# Patient Record
Sex: FEMALE | Race: WHITE | NOT HISPANIC OR LATINO | Employment: OTHER | URBAN - METROPOLITAN AREA
[De-identification: names, ages, dates, MRNs, and addresses within clinical notes are randomized per-mention and may not be internally consistent; named-entity substitution may affect disease eponyms.]

---

## 2017-01-26 ENCOUNTER — LAB CONVERSION - ENCOUNTER (OUTPATIENT)
Dept: OTHER | Facility: OTHER | Age: 82
End: 2017-01-26

## 2017-01-26 LAB — FERRITIN SERPL-MCNC: 37 NG/ML (ref 20–288)

## 2017-01-27 ENCOUNTER — GENERIC CONVERSION - ENCOUNTER (OUTPATIENT)
Dept: OTHER | Facility: OTHER | Age: 82
End: 2017-01-27

## 2017-05-05 ENCOUNTER — LAB CONVERSION - ENCOUNTER (OUTPATIENT)
Dept: OTHER | Facility: OTHER | Age: 82
End: 2017-05-05

## 2017-05-05 LAB
CHOLEST SERPL-MCNC: 158 MG/DL (ref 125–200)
CHOLEST/HDLC SERPL: 3.4 (CALC)
HBA1C MFR BLD HPLC: 6 % OF TOTAL HGB
HDLC SERPL-MCNC: 47 MG/DL
LDL CHOLESTEROL (HISTORICAL): 92 MG/DL (CALC)
NON-HDL-CHOL (CHOL-HDL) (HISTORICAL): 111 MG/DL (CALC)
TRIGL SERPL-MCNC: 97 MG/DL

## 2017-05-08 ENCOUNTER — GENERIC CONVERSION - ENCOUNTER (OUTPATIENT)
Dept: OTHER | Facility: OTHER | Age: 82
End: 2017-05-08

## 2017-05-10 ENCOUNTER — ALLSCRIPTS OFFICE VISIT (OUTPATIENT)
Dept: OTHER | Facility: OTHER | Age: 82
End: 2017-05-10

## 2017-10-02 DIAGNOSIS — E78.5 HYPERLIPIDEMIA: ICD-10-CM

## 2017-10-02 DIAGNOSIS — E11.40 TYPE 2 DIABETES MELLITUS WITH DIABETIC NEUROPATHY (HCC): ICD-10-CM

## 2017-10-02 DIAGNOSIS — F41.9 ANXIETY DISORDER: ICD-10-CM

## 2017-10-02 DIAGNOSIS — G47.33 OBSTRUCTIVE SLEEP APNEA: ICD-10-CM

## 2017-10-02 DIAGNOSIS — I10 ESSENTIAL (PRIMARY) HYPERTENSION: ICD-10-CM

## 2017-10-02 DIAGNOSIS — M06.9 RHEUMATOID ARTHRITIS(714.0): ICD-10-CM

## 2017-10-13 ENCOUNTER — LAB CONVERSION - ENCOUNTER (OUTPATIENT)
Dept: OTHER | Facility: OTHER | Age: 82
End: 2017-10-13

## 2017-10-13 ENCOUNTER — GENERIC CONVERSION - ENCOUNTER (OUTPATIENT)
Dept: OTHER | Facility: OTHER | Age: 82
End: 2017-10-13

## 2017-10-13 LAB
A/G RATIO (HISTORICAL): 1.1 (CALC) (ref 1–2.5)
A/G RATIO (HISTORICAL): 1.3 (CALC) (ref 1–2.5)
ALBUMIN SERPL BCP-MCNC: 3.6 G/DL (ref 3.6–5.1)
ALBUMIN SERPL BCP-MCNC: 3.7 G/DL (ref 3.6–5.1)
ALP SERPL-CCNC: 103 U/L (ref 33–130)
ALP SERPL-CCNC: 103 U/L (ref 33–130)
ALT SERPL W P-5'-P-CCNC: 5 U/L (ref 6–29)
ALT SERPL W P-5'-P-CCNC: 5 U/L (ref 6–29)
AST SERPL W P-5'-P-CCNC: 11 U/L (ref 10–35)
AST SERPL W P-5'-P-CCNC: 12 U/L (ref 10–35)
BASOPHILS # BLD AUTO: 0.5 %
BASOPHILS # BLD AUTO: 37 CELLS/UL (ref 0–200)
BILIRUB SERPL-MCNC: 0.4 MG/DL (ref 0.2–1.2)
BILIRUB SERPL-MCNC: 0.5 MG/DL (ref 0.2–1.2)
BILIRUBIN DIRECT (HISTORICAL): 0.1 MG/DL
BUN SERPL-MCNC: 35 MG/DL (ref 7–25)
BUN/CREA RATIO (HISTORICAL): 30 (CALC) (ref 6–22)
C-REACTIVE PROTEIN (HISTORICAL): 8.5 MG/L
CALCIUM SERPL-MCNC: 8.8 MG/DL (ref 8.6–10.4)
CHLORIDE SERPL-SCNC: 107 MMOL/L (ref 98–110)
CHOLEST SERPL-MCNC: 168 MG/DL
CHOLEST/HDLC SERPL: 4 (CALC)
CO2 SERPL-SCNC: 24 MMOL/L (ref 20–31)
CREAT SERPL-MCNC: 1.14 MG/DL (ref 0.6–0.88)
CREAT SERPL-MCNC: 1.18 MG/DL (ref 0.6–0.88)
DEPRECATED RDW RBC AUTO: 13.8 % (ref 11–15)
EGFR AFRICAN AMERICAN (HISTORICAL): 50 ML/MIN/1.73M2
EGFR AFRICAN AMERICAN (HISTORICAL): 52 ML/MIN/1.73M2
EGFR-AMERICAN CALC (HISTORICAL): 43 ML/MIN/1.73M2
EGFR-AMERICAN CALC (HISTORICAL): 45 ML/MIN/1.73M2
EOSINOPHIL # BLD AUTO: 161 CELLS/UL (ref 15–500)
EOSINOPHIL # BLD AUTO: 2.2 %
ERYTHROCYTE SEDIMENTATION RATE (HISTORICAL): 38 MM/H
GAMMA GLOBULIN (HISTORICAL): 2.9 G/DL (CALC) (ref 1.9–3.7)
GAMMA GLOBULIN (HISTORICAL): 3.2 G/DL (CALC) (ref 1.9–3.7)
GLUCOSE (HISTORICAL): 155 MG/DL (ref 65–99)
HBA1C MFR BLD HPLC: 5.7 % OF TOTAL HGB
HCT VFR BLD AUTO: 35.4 % (ref 35–45)
HDLC SERPL-MCNC: 42 MG/DL
HGB BLD-MCNC: 11.5 G/DL (ref 11.7–15.5)
INDIRECT BILIRUBIN (HISTORICAL): 0.3 MG/DL (CALC) (ref 0.2–1.2)
LDL CHOLESTEROL (HISTORICAL): 106 MG/DL (CALC)
LYMPHOCYTES # BLD AUTO: 10.3 %
LYMPHOCYTES # BLD AUTO: 752 CELLS/UL (ref 850–3900)
MCH RBC QN AUTO: 29.5 PG (ref 27–33)
MCHC RBC AUTO-ENTMCNC: 32.5 G/DL (ref 32–36)
MCV RBC AUTO: 90.8 FL (ref 80–100)
MONOCYTES # BLD AUTO: 701 CELLS/UL (ref 200–950)
MONOCYTES (HISTORICAL): 9.6 %
NEUTROPHILS # BLD AUTO: 5650 CELLS/UL (ref 1500–7800)
NEUTROPHILS # BLD AUTO: 77.4 %
NON-HDL-CHOL (CHOL-HDL) (HISTORICAL): 126 MG/DL (CALC)
PLATELET # BLD AUTO: 186 THOUSAND/UL (ref 140–400)
PMV BLD AUTO: 10.3 FL (ref 7.5–12.5)
POTASSIUM SERPL-SCNC: 4.9 MMOL/L (ref 3.5–5.3)
RBC # BLD AUTO: 3.9 MILLION/UL (ref 3.8–5.1)
SODIUM SERPL-SCNC: 139 MMOL/L (ref 135–146)
TOTAL PROTEIN (HISTORICAL): 6.6 G/DL (ref 6.1–8.1)
TOTAL PROTEIN (HISTORICAL): 6.8 G/DL (ref 6.1–8.1)
TRIGL SERPL-MCNC: 107 MG/DL
WBC # BLD AUTO: 7.3 THOUSAND/UL (ref 3.8–10.8)

## 2017-10-17 ENCOUNTER — GENERIC CONVERSION - ENCOUNTER (OUTPATIENT)
Dept: OTHER | Facility: OTHER | Age: 82
End: 2017-10-17

## 2018-01-10 NOTE — RESULT NOTES
Verified Results  (1) COMPREHENSIVE METABOLIC PANEL [id number] 62:03TL True Ada     Test Name Result Flag Reference   GLUCOSE 155 mg/dL H 65-99   Fasting reference interval     For someone without known diabetes, a glucose  value >125 mg/dL indicates that they may have  diabetes and this should be confirmed with a  follow-up test    UREA NITROGEN (BUN) 35 mg/dL H 7-25   CREATININE 1 18 mg/dL H 0 60-0 88   For patients >52years of age, the reference limit  for Creatinine is approximately 13% higher for people  identified as -American  eGFR NON-AFR  AMERICAN 43 mL/min/1 73m2 L > OR = 60   eGFR AFRICAN AMERICAN 50 mL/min/1 73m2 L > OR = 60   BUN/CREATININE RATIO 30 (calc) H 6-22   SODIUM 139 mmol/L  135-146   POTASSIUM 4 9 mmol/L  3 5-5 3   CHLORIDE 107 mmol/L     CARBON DIOXIDE 24 mmol/L  20-31   CALCIUM 8 8 mg/dL  8 6-10 4   PROTEIN, TOTAL 6 6 g/dL  6 1-8 1   ALBUMIN 3 7 g/dL  3 6-5 1   GLOBULIN 2 9 g/dL (calc)  1 9-3 7   ALBUMIN/GLOBULIN RATIO 1 3 (calc)  1 0-2 5   BILIRUBIN, TOTAL 0 5 mg/dL  0 2-1 2   ALKALINE PHOSPHATASE 103 U/L     AST 11 U/L  10-35   ALT 5 U/L L 6-29     (Q) LIPID PANEL WITH REFLEX TO DIRECT LDL 12Oct2017 11:28AM True Ada     Test Name Result Flag Reference   CHOLESTEROL, TOTAL 168 mg/dL  <200   HDL CHOLESTEROL 42 mg/dL L >13   TRIGLICERIDES 293 mg/dL  <150   LDL-CHOLESTEROL 106 mg/dL (calc) H    Reference range: <100     Desirable range <100 mg/dL for patients with CHD or  diabetes and <70 mg/dL for diabetic patients with  known heart disease  LDL-C is now calculated using the Taras-Rendon   calculation, which is a validated novel method providing   better accuracy than the Friedewald equation in the   estimation of LDL-C  [facility]ivek [facility]  5261;722(57): [phone]   (http://Summize/faq/KOC979)   CHOL/HDLC RATIO 4 0 (calc)  <5 0   NON HDL CHOLESTEROL 126 mg/dL (calc)  <130   For patients with diabetes plus 1 major ASCVD risk   factor, treating to a non-HDL-C goal of <100 mg/dL   (LDL-C of <70 mg/dL) is considered a therapeutic   option  (Q) HEMOGLOBIN A1c 12Oct2017 11:28AM Drew Osman   REPORT COMMENT:  FASTING:YES     Test Name Result Flag Reference   HEMOGLOBIN A1c 5 7 % of total Hgb H <5 7   For someone without known diabetes, a hemoglobin   A1c value between 5 7% and 6 4% is consistent with  prediabetes and should be confirmed with a   follow-up test      For someone with known diabetes, a value <7%  indicates that their diabetes is well controlled  A1c  targets should be individualized based on duration of  diabetes, age, comorbid conditions, and other  considerations  This assay result is consistent with an increased risk  of diabetes  Currently, no consensus exists regarding use of  hemoglobin A1c for diagnosis of diabetes for children  Discussion/Summary   DIABETES VERY STABLE   LABS STABLE

## 2018-01-13 VITALS
SYSTOLIC BLOOD PRESSURE: 140 MMHG | HEIGHT: 63 IN | HEART RATE: 103 BPM | DIASTOLIC BLOOD PRESSURE: 88 MMHG | WEIGHT: 247 LBS | BODY MASS INDEX: 43.77 KG/M2 | RESPIRATION RATE: 16 BRPM | OXYGEN SATURATION: 98 % | TEMPERATURE: 98.4 F

## 2018-01-13 NOTE — RESULT NOTES
Verified Results  (1) COMPREHENSIVE METABOLIC PANEL [id number] 91:24XB Jayedax Lashell     Test Name Result Flag Reference   GLUCOSE 136 mg/dL H 65-99   Fasting reference interval   UREA NITROGEN (BUN) 24 mg/dL  7-25   CREATININE 0 97 mg/dL H 0 60-0 88   For patients >52years of age, the reference limit  for Creatinine is approximately 13% higher for people  identified as -American  eGFR NON-AFR   AMERICAN 55 mL/min/1 73m2 L > OR = 60   eGFR AFRICAN AMERICAN 63 mL/min/1 73m2  > OR = 60   BUN/CREATININE RATIO 25 (calc) H 6-22   SODIUM 139 mmol/L  135-146   POTASSIUM 4 9 mmol/L  3 5-5 3   CHLORIDE 108 mmol/L     CARBON DIOXIDE 26 mmol/L  20-31   CALCIUM 9 2 mg/dL  8 6-10 4   PROTEIN, TOTAL 6 9 g/dL  6 1-8 1   ALBUMIN 3 8 g/dL  3 6-5 1   GLOBULIN 3 1 g/dL (calc)  1 9-3 7   ALBUMIN/GLOBULIN RATIO 1 2 (calc)  1 0-2 5   BILIRUBIN, TOTAL 0 5 mg/dL  0 2-1 2   ALKALINE PHOSPHATASE 101 U/L     AST 14 U/L  10-35   ALT 7 U/L  6-29     (1) CBC/PLT/DIFF 12Oct2016 12:24PM Jamie Lobo     Test Name Result Flag Reference   WHITE BLOOD CELL COUNT 5 9 Thousand/uL  3 8-10 8   RED BLOOD CELL COUNT 3 61 Million/uL L 3 80-5 10   HEMOGLOBIN 10 8 g/dL L 11 7-15 5   HEMATOCRIT 32 9 % L 35 0-45 0   MCV 91 3 fL  80 0-100 0   MCH 29 8 pg  27 0-33 0   MCHC 32 6 g/dL  32 0-36 0   RDW 16 5 % H 11 0-15 0   PLATELET COUNT 112 Thousand/uL  140-400   MPV 8 3 fL  7 5-11 5   ABSOLUTE NEUTROPHILS 4224 cells/uL  3711-9931   ABSOLUTE LYMPHOCYTES 920 cells/uL  850-3900   ABSOLUTE MONOCYTES 566 cells/uL  200-950   ABSOLUTE EOSINOPHILS 130 cells/uL     ABSOLUTE BASOPHILS 59 cells/uL  0-200   NEUTROPHILS 71 6 %     LYMPHOCYTES 15 6 %     MONOCYTES 9 6 %     EOSINOPHILS 2 2 %     BASOPHILS 1 0 %       (Q) HEMOGLOBIN A1c 12Oct2016 12:24PM Jamie Lobo   REPORT COMMENT:  FASTING:YES     Test Name Result Flag Reference   HEMOGLOBIN A1c 5 7 % of total Hgb H <5 7   According to ADA guidelines, hemoglobin A1c <7 0%  represents optimal control in non-pregnant diabetic  patients  Different metrics may apply to specific  patient populations  Standards of Medical Care in    Diabetes Care  2013;36:s11-s66     For the purpose of screening for the presence of  diabetes  <5 7%       Consistent with the absence of diabetes  5 7-6 4%    Consistent with increased risk for diabetes              (prediabetes)  >or=6 5%    Consistent with diabetes     This assay result is consistent with an increased risk  of diabetes  Currently, no consensus exists for use of hemoglobin  A1c for diagnosis of diabetes for children  Discussion/Summary   you are newly anemic  we will have to review this    diabetes is controlled well

## 2018-01-15 NOTE — RESULT NOTES
Verified Results  (1) COMPREHENSIVE METABOLIC PANEL [id number] 02:79BQ Pardeep Kang     Test Name Result Flag Reference   GLUCOSE 154 mg/dL H 65-99   Fasting reference interval   UREA NITROGEN (BUN) 31 mg/dL H 7-25   CREATININE 0 99 mg/dL H 0 60-0 88   For patients >52years of age, the reference limit  for Creatinine is approximately 13% higher for people  identified as -American  eGFR NON-AFR  AMERICAN 54 mL/min/1 73m2 L > OR = 60   eGFR AFRICAN AMERICAN 62 mL/min/1 73m2  > OR = 60   BUN/CREATININE RATIO 31 (calc) H 6-22   SODIUM 139 mmol/L  135-146   POTASSIUM 4 5 mmol/L  3 5-5 3   CHLORIDE 107 mmol/L     CARBON DIOXIDE 17 mmol/L L 19-30   CALCIUM 9 3 mg/dL  8 6-10 4   PROTEIN, TOTAL 7 2 g/dL  6 1-8 1   ALBUMIN 4 0 g/dL  3 6-5 1   GLOBULIN 3 2 g/dL (calc)  1 9-3 7   ALBUMIN/GLOBULIN RATIO 1 3 (calc)  1 0-2 5   BILIRUBIN, TOTAL 0 4 mg/dL  0 2-1 2   ALKALINE PHOSPHATASE 99 U/L     AST 13 U/L  10-35   ALT 9 U/L  6-29     (Q) MICROALBUMIN, RANDOM URINE (W/CREATININE) 14Apr2016 12:21PM Pardeep Kang     Test Name Result Flag Reference   CREATININE, RANDOM URINE 78 mg/dL     MICROALBUMIN 2 2 mg/dL     Reference Range  Not established   MICROALBUMIN/CREATININE$RATIO, RANDOM URINE 28 mcg/mg creat  <30   The ADA defines abnormalities in albumin  excretion as follows:     Category         Result (mcg/mg creatinine)     Normal                    <30  Microalbuminuria            Clinical albuminuria   > OR = 300     The ADA recommends that at least two of three  specimens collected within a 3-6 month period be  abnormal before considering a patient to be  within a diagnostic category       (Q) CBC (H/H, RBC, INDICES, WBC, PLT) 14Apr2016 12:21PM Pardeep Kang     Test Name Result Flag Reference   WHITE BLOOD CELL COUNT 6 6 Thousand/uL  3 8-10 8   RED BLOOD CELL COUNT 4 21 Million/uL  3 80-5 10   HEMOGLOBIN 12 2 g/dL  11 7-15 5   HEMATOCRIT 37 8 %  35 0-45 0   MCV 89 7 fL  80 0-100 0 MCH 29 0 pg  27 0-33 0   MCHC 32 3 g/dL  32 0-36 0   RDW 16 6 % H 11 0-15 0   PLATELET COUNT 825 Thousand/uL  140-400   MPV 8 5 fL  7 5-11 5     (Q) TSH, 3RD GENERATION W/REFLEX TO FT4 14Apr2016 12:21PM Artice Edge     Test Name Result Flag Reference   TSH W/REFLEX TO FT4 3 38 mIU/L  0 40-4 50     (Q) HEMOGLOBIN A1c 14Apr2016 12:21PM Artice Edge   REPORT COMMENT:  FASTING:YES     Test Name Result Flag Reference   HEMOGLOBIN A1c 6 1 % of total Hgb H <5 7   According to ADA guidelines, hemoglobin A1c <7 0%  represents optimal control in non-pregnant diabetic  patients  Different metrics may apply to specific  patient populations  Standards of Medical Care in    Diabetes Care  2013;36:s11-s66     For the purpose of screening for the presence of  diabetes  <5 7%       Consistent with the absence of diabetes  5 7-6 4%    Consistent with increased risk for diabetes              (prediabetes)  >or=6 5%    Consistent with diabetes     This assay result is consistent with a higher risk  of diabetes  Currently, no consensus exists for use of hemoglobin  A1c for diagnosis of diabetes for children  Discussion/Summary   LABS ARE STABLE AND OK   GOOD  Signatures   Electronically signed by : BIRD Hayden;  Apr 16 2016  9:56AM EST                       (Author)

## 2018-01-16 NOTE — RESULT NOTES
Verified Results  (1) CBC/PLT/DIFF [id number] 12:17PM Zuly Lopez     Test Name Result Flag Reference   WHITE BLOOD CELL COUNT 8 7 Thousand/uL  3 8-10 8   RED BLOOD CELL COUNT 4 22 Million/uL  3 80-5 10   HEMOGLOBIN 11 9 g/dL  11 7-15 5   HEMATOCRIT 37 0 %  35 0-45 0   MCV 87 7 fL  80 0-100 0   MCH 28 3 pg  27 0-33 0   MCHC 32 3 g/dL  32 0-36 0   RDW 16 4 % H 11 0-15 0   PLATELET COUNT 602 Thousand/uL  140-400   MPV 8 2 fL  7 5-12 5   ABSOLUTE NEUTROPHILS 6803 cells/uL  4329-0487   ABSOLUTE LYMPHOCYTES 879 cells/uL  850-3900   ABSOLUTE MONOCYTES 827 cells/uL  200-950   ABSOLUTE EOSINOPHILS 131 cells/uL     ABSOLUTE BASOPHILS 61 cells/uL  0-200   NEUTROPHILS 78 2 %     LYMPHOCYTES 10 1 %     MONOCYTES 9 5 %     EOSINOPHILS 1 5 %     BASOPHILS 0 7 %       (1) IRON SATURATION %, TIBC [id number] 12:17PM Zuly Lopez     Test Name Result Flag Reference   IRON, TOTAL 57 mcg/dL     IRON BINDING CAPACITY 277 mcg/dL (calc)  250-450   % SATURATION 21 % (calc)  11-50     (1) VITAMIN B12 25Jan2017 12:17PM Zuly Lopez     Test Name Result Flag Reference   VITAMIN B12 481 pg/mL  200-1100     (1) OP NICHOLE FERRITIN [id number] 12:17PM Zuly Lopez     Test Name Result Flag Reference   FERRITIN 37 ng/mL         Discussion/Summary   LABS LOOK GOOD AND IMPROVED!

## 2018-01-18 NOTE — RESULT NOTES
Verified Results  (1) COMPREHENSIVE METABOLIC PANEL [id number] 33:98PY Pardeep Kang     Test Name Result Flag Reference   GLUCOSE 167 mg/dL H 65-99   Fasting reference interval     For someone without known diabetes, a glucose  value >125 mg/dL indicates that they may have  diabetes and this should be confirmed with a  follow-up test    UREA NITROGEN (BUN) 25 mg/dL  7-25   CREATININE 1 11 mg/dL H 0 60-0 88   For patients >52years of age, the reference limit  for Creatinine is approximately 13% higher for people  identified as -American  eGFR NON-AFR   AMERICAN 46 mL/min/1 73m2 L > OR = 60   eGFR AFRICAN AMERICAN 54 mL/min/1 73m2 L > OR = 60   BUN/CREATININE RATIO 23 (calc) H 6-22   SODIUM 142 mmol/L  135-146   POTASSIUM 4 9 mmol/L  3 5-5 3   CHLORIDE 108 mmol/L     CARBON DIOXIDE 26 mmol/L  20-31   CALCIUM 9 3 mg/dL  8 6-10 4   PROTEIN, TOTAL 6 9 g/dL  6 1-8 1   ALBUMIN 3 8 g/dL  3 6-5 1   GLOBULIN 3 1 g/dL (calc)  1 9-3 7   ALBUMIN/GLOBULIN RATIO 1 2 (calc)  1 0-2 5   BILIRUBIN, TOTAL 0 4 mg/dL  0 2-1 2   ALKALINE PHOSPHATASE 120 U/L     AST 11 U/L  10-35   ALT 6 U/L  6-29     (1) CBC/PLT/DIFF [id number] 12:27PM Pardeep Kang     Test Name Result Flag Reference   WHITE BLOOD CELL COUNT 6 8 Thousand/uL  3 8-10 8   RED BLOOD CELL COUNT 3 93 Million/uL  3 80-5 10   HEMOGLOBIN 11 5 g/dL L 11 7-15 5   HEMATOCRIT 35 6 %  35 0-45 0   MCV 90 5 fL  80 0-100 0   MCH 29 2 pg  27 0-33 0   MCHC 32 3 g/dL  32 0-36 0   RDW 16 5 % H 11 0-15 0   PLATELET COUNT 753 Thousand/uL  140-400   ABSOLUTE NEUTROPHILS 5161 cells/uL  0518-4974   ABSOLUTE LYMPHOCYTES 952 cells/uL  850-3900   ABSOLUTE MONOCYTES 524 cells/uL  200-950   ABSOLUTE EOSINOPHILS 129 cells/uL     ABSOLUTE BASOPHILS 34 cells/uL  0-200   NEUTROPHILS 75 9 %     LYMPHOCYTES 14 0 %     MONOCYTES 7 7 %     EOSINOPHILS 1 9 %     BASOPHILS 0 5 %     MPV 8 2 fL  7 5-12 5     (Q) LIPID PANEL WITH REFLEX TO DIRECT LDL [id number] 12:27PM Jaiden Lianet Giron     Test Name Result Flag Reference   CHOLESTEROL, TOTAL 158 mg/dL  125-200   HDL CHOLESTEROL 47 mg/dL  > OR = 46   TRIGLICERIDES 97 mg/dL  <075   LDL-CHOLESTEROL 92 mg/dL (calc)  <130   Desirable range <100 mg/dL for patients with CHD or  diabetes and <70 mg/dL for diabetic patients with  known heart disease  CHOL/HDLC RATIO 3 4 (calc)  < OR = 5 0   NON HDL CHOLESTEROL 111 mg/dL (calc)     Target for non-HDL cholesterol is 30 mg/dL higher than   LDL cholesterol target  (Q) HEMOGLOBIN A1c [id number] 12:27PM Derek Christensen   REPORT COMMENT:  FASTING:YES     Test Name Result Flag Reference   HEMOGLOBIN A1c 6 0 % of total Hgb H <5 7   For someone without known diabetes, a hemoglobin   A1c value between 5 7% and 6 4% is consistent with  prediabetes and should be confirmed with a   follow-up test      For someone with known diabetes, a value <7%  indicates that their diabetes is well controlled  A1c  targets should be individualized based on duration of  diabetes, age, comorbid conditions, and other  considerations  This assay result is consistent with an increased risk  of diabetes  Currently, no consensus exists regarding use of  hemoglobin A1c for diagnosis of diabetes for children         Discussion/Summary   LABS STABLE

## 2018-01-22 VITALS
HEIGHT: 63 IN | TEMPERATURE: 98.2 F | SYSTOLIC BLOOD PRESSURE: 132 MMHG | RESPIRATION RATE: 16 BRPM | WEIGHT: 231.04 LBS | BODY MASS INDEX: 40.94 KG/M2 | DIASTOLIC BLOOD PRESSURE: 72 MMHG | HEART RATE: 68 BPM | OXYGEN SATURATION: 98 %

## 2018-06-05 DIAGNOSIS — I10 ESSENTIAL HYPERTENSION: Primary | ICD-10-CM

## 2018-06-05 RX ORDER — HYDROCHLOROTHIAZIDE 25 MG/1
1 TABLET ORAL DAILY
COMMUNITY
Start: 2012-06-20 | End: 2018-06-05 | Stop reason: SDUPTHER

## 2018-06-05 RX ORDER — HYDROCHLOROTHIAZIDE 25 MG/1
25 TABLET ORAL DAILY
Qty: 90 TABLET | Refills: 0 | Status: SHIPPED | OUTPATIENT
Start: 2018-06-05 | End: 2018-06-28 | Stop reason: ALTCHOICE

## 2018-06-17 DIAGNOSIS — M06.9 RHEUMATOID ARTHRITIS (HCC): ICD-10-CM

## 2018-06-17 DIAGNOSIS — E78.5 HYPERLIPIDEMIA: ICD-10-CM

## 2018-06-17 DIAGNOSIS — E11.40 TYPE 2 DIABETES MELLITUS WITH DIABETIC NEUROPATHY (HCC): ICD-10-CM

## 2018-06-17 DIAGNOSIS — G47.33 OBSTRUCTIVE SLEEP APNEA: ICD-10-CM

## 2018-06-17 DIAGNOSIS — I10 ESSENTIAL (PRIMARY) HYPERTENSION: ICD-10-CM

## 2018-06-28 ENCOUNTER — TELEPHONE (OUTPATIENT)
Dept: FAMILY MEDICINE CLINIC | Facility: CLINIC | Age: 83
End: 2018-06-28

## 2018-06-28 DIAGNOSIS — R60.0 LOCALIZED EDEMA: Primary | ICD-10-CM

## 2018-06-28 RX ORDER — SULFASALAZINE 500 MG/1
2 TABLET, DELAYED RELEASE ORAL 2 TIMES DAILY
COMMUNITY
Start: 2013-04-16 | End: 2019-01-01 | Stop reason: HOSPADM

## 2018-06-28 RX ORDER — FUROSEMIDE 40 MG/1
40 TABLET ORAL DAILY
Qty: 30 TABLET | Refills: 5 | Status: SHIPPED | OUTPATIENT
Start: 2018-06-28 | End: 2019-01-01 | Stop reason: SDUPTHER

## 2018-06-28 RX ORDER — ALPRAZOLAM 0.25 MG/1
1 TABLET ORAL 2 TIMES DAILY PRN
COMMUNITY
Start: 2012-03-19 | End: 2018-07-16 | Stop reason: SDUPTHER

## 2018-06-28 RX ORDER — FOSINOPRIL SODIUM 40 MG/1
1 TABLET ORAL DAILY
COMMUNITY
Start: 2012-03-19 | End: 2018-07-16 | Stop reason: SDUPTHER

## 2018-06-28 RX ORDER — LATANOPROST 50 UG/ML
SOLUTION/ DROPS OPHTHALMIC
COMMUNITY
End: 2019-01-01 | Stop reason: HOSPADM

## 2018-06-28 RX ORDER — PILOCARPINE HYDROCHLORIDE 10 MG/ML
SOLUTION/ DROPS OPHTHALMIC
Refills: 3 | COMMUNITY
Start: 2018-03-21 | End: 2019-01-01 | Stop reason: HOSPADM

## 2018-06-28 RX ORDER — LEUCOVORIN CALCIUM 5 MG/1
TABLET ORAL
COMMUNITY
Start: 2012-12-13 | End: 2019-01-01 | Stop reason: HOSPADM

## 2018-06-28 RX ORDER — LANOLIN ALCOHOL/MO/W.PET/CERES
CREAM (GRAM) TOPICAL
COMMUNITY
Start: 2011-08-17 | End: 2019-01-01 | Stop reason: HOSPADM

## 2018-06-28 RX ORDER — PRAVASTATIN SODIUM 20 MG
1 TABLET ORAL DAILY
COMMUNITY
Start: 2012-02-23 | End: 2018-09-04 | Stop reason: SDUPTHER

## 2018-07-10 ENCOUNTER — TELEPHONE (OUTPATIENT)
Dept: FAMILY MEDICINE CLINIC | Facility: CLINIC | Age: 83
End: 2018-07-10

## 2018-07-12 NOTE — TELEPHONE ENCOUNTER
Spoke to patient  She is having leg edema  Taking extra lasix with some improvement  Having leg weakness  Would like to have home physical therapy  Does not have secondary insurance to her medicare  Not sure she can afford it  Will take more days of lasix and see how she is, if still weak will call me Monday and we will order home PT  ema

## 2018-07-16 DIAGNOSIS — I10 ESSENTIAL HYPERTENSION: Primary | ICD-10-CM

## 2018-07-16 DIAGNOSIS — F41.9 ANXIETY: ICD-10-CM

## 2018-07-16 RX ORDER — FOSINOPRIL SODIUM 40 MG/1
40 TABLET ORAL DAILY
Qty: 30 TABLET | Refills: 5 | Status: SHIPPED | OUTPATIENT
Start: 2018-07-16 | End: 2019-01-01 | Stop reason: SDUPTHER

## 2018-07-16 RX ORDER — ALPRAZOLAM 0.25 MG/1
0.25 TABLET ORAL 2 TIMES DAILY PRN
Qty: 60 TABLET | Refills: 0 | Status: SHIPPED | OUTPATIENT
Start: 2018-07-16 | End: 2018-11-14 | Stop reason: SDUPTHER

## 2018-08-14 ENCOUNTER — TELEPHONE (OUTPATIENT)
Dept: FAMILY MEDICINE CLINIC | Facility: CLINIC | Age: 83
End: 2018-08-14

## 2018-08-14 NOTE — TELEPHONE ENCOUNTER
Spoke with son, he would like mother to get therapy for her knee pain, unable to do steps and this concerns him  Also has wax in her ear and wondering if nurse can flush ear, will check with [facility] and see  [facility] refer to [facility] for home safety eval and knee pain  Will see if [facility] nurse can do any procedures  ema

## 2018-08-16 DIAGNOSIS — M19.90 OSTEOARTHRITIS, UNSPECIFIED OSTEOARTHRITIS TYPE, UNSPECIFIED SITE: ICD-10-CM

## 2018-08-16 DIAGNOSIS — M05.79 RHEUMATOID ARTHRITIS INVOLVING MULTIPLE SITES WITH POSITIVE RHEUMATOID FACTOR (HCC): ICD-10-CM

## 2018-08-16 DIAGNOSIS — R26.9 ABNORMALITY OF GAIT AND MOBILITY: Primary | ICD-10-CM

## 2018-08-16 DIAGNOSIS — R53.81 PHYSICAL DECONDITIONING: ICD-10-CM

## 2018-08-16 NOTE — TELEPHONE ENCOUNTER
Call son  Angeline Childress  I put referral in to [facility] for physical therapy  Per medicare guidelines, they cannot send someone into the home unless patient has been seen by a provider within the past 90 days for this referral  She was last seen over 90 days ago  Also no one can come into the home to flush ears  So with that being said, we are going to have to see her in the office at least once and then I can order home PT and I can flush ears in the office as well  Paulette Ken

## 2018-08-20 ENCOUNTER — TELEPHONE (OUTPATIENT)
Dept: FAMILY MEDICINE CLINIC | Facility: CLINIC | Age: 83
End: 2018-08-20

## 2018-08-20 NOTE — TELEPHONE ENCOUNTER
Look at my telephone note for 8/16 with all the instructions to call the son, was this done? ?  All the information on this message is there, I sent it to the staff, did I send it correctly? ?   Please call patient and son michelle with the information  Thanks  ema

## 2018-08-23 ENCOUNTER — OFFICE VISIT (OUTPATIENT)
Dept: FAMILY MEDICINE CLINIC | Facility: CLINIC | Age: 83
End: 2018-08-23
Payer: MEDICARE

## 2018-08-23 VITALS
SYSTOLIC BLOOD PRESSURE: 112 MMHG | RESPIRATION RATE: 14 BRPM | DIASTOLIC BLOOD PRESSURE: 62 MMHG | HEART RATE: 64 BPM | OXYGEN SATURATION: 95 % | TEMPERATURE: 98.6 F

## 2018-08-23 DIAGNOSIS — E55.9 VITAMIN D DEFICIENCY: ICD-10-CM

## 2018-08-23 DIAGNOSIS — E11.40 TYPE 2 DIABETES MELLITUS WITH DIABETIC NEUROPATHY, WITHOUT LONG-TERM CURRENT USE OF INSULIN (HCC): Primary | ICD-10-CM

## 2018-08-23 DIAGNOSIS — I10 BENIGN ESSENTIAL HYPERTENSION: ICD-10-CM

## 2018-08-23 DIAGNOSIS — M19.90 OSTEOARTHRITIS, UNSPECIFIED OSTEOARTHRITIS TYPE, UNSPECIFIED SITE: ICD-10-CM

## 2018-08-23 DIAGNOSIS — G47.33 OBSTRUCTIVE SLEEP APNEA: ICD-10-CM

## 2018-08-23 DIAGNOSIS — M05.79 RHEUMATOID ARTHRITIS INVOLVING MULTIPLE SITES WITH POSITIVE RHEUMATOID FACTOR (HCC): ICD-10-CM

## 2018-08-23 PROCEDURE — 99214 OFFICE O/P EST MOD 30 MIN: CPT | Performed by: NURSE PRACTITIONER

## 2018-08-23 NOTE — PROGRESS NOTES
Assessment/Plan:           Problem List Items Addressed This Visit        Endocrine    Type 2 diabetes mellitus with diabetic neuropathy ([facility] ) - Primary     Lab Results   Component Value Date    HGBA1C 5 7 (H) 10/12/2017       No results for input(s): POCGLU in the last 72 hours    Cont current meds    Blood Sugar Average: Last 72 hrs:           Relevant Orders    Comprehensive metabolic panel    CBC and differential    TSH, 3rd generation with Free T4 reflex    HEMOGLOBIN A1C W/ EAG ESTIMATION    Lipid Panel with Direct LDL reflex    Ambulatory Referral to Home Health       Respiratory    Obstructive sleep apnea     Attempt to wear cpap at hs          Relevant Orders    Comprehensive metabolic panel    CBC and differential    TSH, 3rd generation with Free T4 reflex    HEMOGLOBIN A1C W/ EAG ESTIMATION    Lipid Panel with Direct LDL reflex    Ambulatory Referral to Home Health       Cardiovascular and Mediastinum    Benign essential hypertension     Cont current meds           Relevant Orders    Comprehensive metabolic panel    CBC and differential    TSH, 3rd generation with Free T4 reflex    HEMOGLOBIN A1C W/ EAG ESTIMATION    Lipid Panel with Direct LDL reflex    Ambulatory Referral to Home Health       Musculoskeletal and Integument    Osteoarthrosis     Cont meds  Patient would benefit from home safety eval and some physical therapy from [facility] for  Therapy, strength training           Relevant Orders    Comprehensive metabolic panel    CBC and differential    TSH, 3rd generation with Free T4 reflex    HEMOGLOBIN A1C W/ EAG ESTIMATION    Lipid Panel with Direct LDL reflex    Ambulatory Referral to Home Health    Rheumatoid arthritis ([facility] )     Cont meds and f/u with dr Salvador Dubin         Relevant Orders    Comprehensive metabolic panel    CBC and differential    TSH, 3rd generation with Free T4 reflex    HEMOGLOBIN A1C W/ EAG ESTIMATION    Lipid Panel with Direct LDL reflex    Ambulatory Referral to [address] [place] [address] Lincoln Samuel Other    Vitamin D deficiency     Cont supplement          Relevant Orders    Comprehensive metabolic panel    CBC and differential    TSH, 3rd generation with Free T4 reflex    HEMOGLOBIN A1C W/ EAG ESTIMATION    Lipid Panel with Direct LDL reflex    Ambulatory Referral to Home Health            Subjective:      Patient ID: Ochoa Faulkner is a 80 y o  female  Here for face to face encounter for home care  Patient has had decrease in ambulation  Gait dysfunction  Unable to manage the steps at home and unable to get out of the house, was very difficult for son to get pt here for visit today  She feels weaker in the legs and having knee pain  She has not fallen at home  She is using a roller walker to ambulate at home  She is finding it difficult to maneuver the house lately    Also complaining of worsening hearing, thinks she may have wax in her ears, not sure  No pain or drainage from ears          The following portions of the patient's history were reviewed and updated as appropriate: allergies, current medications, past family history, past medical history, past social history, past surgical history and problem list     Review of Systems   Constitutional: Positive for fatigue  Negative for fever  Eyes: Negative for photophobia and visual disturbance  Respiratory: Negative for cough, shortness of breath and wheezing  Cardiovascular: Positive for leg swelling  Negative for chest pain and palpitations  Gastrointestinal: Negative for constipation, diarrhea, nausea and vomiting  Genitourinary: Negative for dysuria and frequency  Musculoskeletal: Positive for arthralgias, gait problem, joint swelling and myalgias  Skin: Negative for rash  Neurological: Positive for weakness  Negative for dizziness, light-headedness and headaches  Hematological: Negative for adenopathy  Psychiatric/Behavioral: Negative for decreased concentration and suicidal ideas  The patient is not nervous/anxious  Objective:      /62 (BP Location: Left arm, Patient Position: Sitting, Cuff Size: Standard)   Pulse 64   Temp 98 6 °F (37 °C)   Resp 14   SpO2 95%     Family History   Problem Relation Age of Onset    Stroke Mother     Colon cancer Father      Past Medical History:   Diagnosis Date    Benign essential hypertension     Diabetes mellitus ([facility] )     Pulmonary embolism ([facility] )     last assessed: 4/16/2013    Type 2 diabetes mellitus ([place] [address] )     description: stable off meds Last assessed: 3/19/2015     Social History     Social History    Marital status:      Spouse name: N/A    Number of children: N/A    Years of education: N/A     Occupational History    Not on file       Social History Main Topics    Smoking status: Never Smoker    Smokeless tobacco: Never Used    Alcohol use No    Drug use: Unknown    Sexual activity: Not on file     Other Topics Concern    Not on file     Social History Narrative    No narrative on file       Current Outpatient Prescriptions:     ALPRAZolam (XANAX) 0 25 mg tablet, Take 1 tablet (0 25 mg total) by mouth 2 (two) times a day as needed for anxiety, Disp: 60 tablet, Rfl: 0    aspirin 81 MG tablet, Take 1 tablet by mouth daily, Disp: , Rfl:     cyanocobalamin (VITAMIN B-12) 1,000 mcg tablet, Take by mouth, Disp: , Rfl:     fosinopril (MONOPRIL) 40 mg tablet, Take 1 tablet (40 mg total) by mouth daily, Disp: 30 tablet, Rfl: 5    furosemide (LASIX) 40 mg tablet, Take 1 tablet (40 mg total) by mouth daily, Disp: 30 tablet, Rfl: 5    latanoprost (XALATAN) 0 005 % ophthalmic solution, Apply to eye, Disp: , Rfl:     leucovorin (WELLCOVORIN) 5 mg tablet, Take by mouth, Disp: , Rfl:     methotrexate 2 5 mg tablet, Take 6 tablets by mouth once a week, Disp: , Rfl:     metoprolol tartrate (LOPRESSOR) 25 mg tablet, Take 0 5 tablets (12 5 mg total) by mouth 2 (two) times a day, Disp: 60 tablet, Rfl: 5    pilocarpine (ISOPTO CARPINE) 1 % ophthalmic solution, INSTILL 1 DROP IN THE LEFT EYE TWO TIMES A DAY, Disp: , Rfl: 3    pravastatin (PRAVACHOL) 20 mg tablet, Take 1 tablet by mouth daily, Disp: , Rfl:     sulfaSALAzine (AZULFIDINE-EN) 500 mg EC tablet, Take 2 tablets by mouth 2 (two) times a day, Disp: , Rfl:   No Known Allergies     Physical Exam   Constitutional: She is oriented to person, place, and time  She appears well-developed and well-nourished  HENT:   Head: Normocephalic and atraumatic  Right Ear: External ear normal    Left Ear: External ear normal    Nose: Nose normal    Mouth/Throat: Oropharynx is clear and moist    Eyes: Conjunctivae are normal    Neck: Normal range of motion  Neck supple  No thyromegaly present  Cardiovascular: Normal rate, regular rhythm, normal heart sounds and intact distal pulses  Pulmonary/Chest: Effort normal and breath sounds normal    Abdominal: Soft  Bowel sounds are normal    Musculoskeletal: She exhibits edema  Right knee: She exhibits decreased range of motion and swelling  Left knee: She exhibits decreased range of motion and swelling  3/5 strength b/l le  Gait unsteady  Using roller walker     Neurological: She is alert and oriented to person, place, and time  Skin: Skin is warm and dry  Psychiatric: She has a normal mood and affect  Her behavior is normal  Judgment and thought content normal    Nursing note and vitals reviewed

## 2018-08-27 NOTE — ASSESSMENT & PLAN NOTE
Lab Results   Component Value Date    HGBA1C 5 7 (H) 10/12/2017       No results for input(s): POCGLU in the last 72 hours    Cont current meds    Blood Sugar Average: Last 72 hrs:

## 2018-08-27 NOTE — ASSESSMENT & PLAN NOTE
Cont meds  Patient would benefit from home safety eval and some physical therapy from [facility] for  Therapy, strength training

## 2018-08-28 ENCOUNTER — TELEPHONE (OUTPATIENT)
Dept: FAMILY MEDICINE CLINIC | Facility: CLINIC | Age: 83
End: 2018-08-28

## 2018-08-31 ENCOUNTER — TELEPHONE (OUTPATIENT)
Dept: FAMILY MEDICINE CLINIC | Facility: CLINIC | Age: 83
End: 2018-08-31

## 2018-08-31 NOTE — TELEPHONE ENCOUNTER
VISITING  NURSE WAS INTO TO SEE LORIE AND  NOTICE THAT SHE TAKES aspirin 81 MG tablet AND THAT SOMETIMES SHE TAKES ADVIL FOR HER Rheumatoid arthritis AND ADVISED HER THAT THEY ARE BOTH BLOOD THINNERS AND TO REACH OUT TO YOU TO SEE WHAT SHE SHOULD BE TAKING FOR THE PAIN     PLEASE ADVISE

## 2018-09-04 DIAGNOSIS — E78.2 MIXED HYPERLIPIDEMIA: Primary | ICD-10-CM

## 2018-09-04 RX ORDER — PRAVASTATIN SODIUM 20 MG
20 TABLET ORAL DAILY
Qty: 30 TABLET | Refills: 5 | Status: SHIPPED | OUTPATIENT
Start: 2018-09-04 | End: 2019-01-01 | Stop reason: SDUPTHER

## 2018-11-12 DIAGNOSIS — F41.9 ANXIETY: ICD-10-CM

## 2018-11-12 RX ORDER — ALPRAZOLAM 0.25 MG/1
0.25 TABLET ORAL 2 TIMES DAILY PRN
Qty: 60 TABLET | Refills: 0 | Status: CANCELLED | OUTPATIENT
Start: 2018-11-12

## 2018-11-14 DIAGNOSIS — F41.9 ANXIETY: ICD-10-CM

## 2018-11-14 RX ORDER — ALPRAZOLAM 0.25 MG/1
0.25 TABLET ORAL 2 TIMES DAILY PRN
Qty: 60 TABLET | Refills: 0 | Status: CANCELLED | OUTPATIENT
Start: 2018-11-14

## 2018-11-14 RX ORDER — ALPRAZOLAM 0.25 MG/1
0.25 TABLET ORAL 2 TIMES DAILY PRN
Qty: 60 TABLET | Refills: 0 | Status: SHIPPED | OUTPATIENT
Start: 2018-11-14 | End: 2019-01-01 | Stop reason: SDUPTHER

## 2019-01-01 ENCOUNTER — APPOINTMENT (EMERGENCY)
Dept: RADIOLOGY | Facility: HOSPITAL | Age: 84
DRG: 853 | End: 2019-01-01
Payer: MEDICARE

## 2019-01-01 ENCOUNTER — TELEPHONE (OUTPATIENT)
Dept: FAMILY MEDICINE CLINIC | Facility: CLINIC | Age: 84
End: 2019-01-01

## 2019-01-01 ENCOUNTER — ANESTHESIA EVENT (EMERGENCY)
Dept: PERIOP | Facility: HOSPITAL | Age: 84
DRG: 853 | End: 2019-01-01
Payer: MEDICARE

## 2019-01-01 ENCOUNTER — APPOINTMENT (INPATIENT)
Dept: RADIOLOGY | Facility: HOSPITAL | Age: 84
DRG: 853 | End: 2019-01-01
Payer: MEDICARE

## 2019-01-01 ENCOUNTER — IN HOME VISIT (OUTPATIENT)
Dept: FAMILY MEDICINE CLINIC | Facility: CLINIC | Age: 84
End: 2019-01-01
Payer: MEDICARE

## 2019-01-01 ENCOUNTER — ANESTHESIA (EMERGENCY)
Dept: PERIOP | Facility: HOSPITAL | Age: 84
DRG: 853 | End: 2019-01-01
Payer: MEDICARE

## 2019-01-01 ENCOUNTER — HOSPITAL ENCOUNTER (INPATIENT)
Facility: HOSPITAL | Age: 84
LOS: 1 days | DRG: 853 | End: 2019-12-18
Attending: EMERGENCY MEDICINE | Admitting: SURGERY
Payer: MEDICARE

## 2019-01-01 VITALS
HEART RATE: 74 BPM | SYSTOLIC BLOOD PRESSURE: 130 MMHG | RESPIRATION RATE: 20 BRPM | OXYGEN SATURATION: 96 % | DIASTOLIC BLOOD PRESSURE: 82 MMHG | TEMPERATURE: 98.9 F

## 2019-01-01 VITALS
RESPIRATION RATE: 26 BRPM | SYSTOLIC BLOOD PRESSURE: 146 MMHG | BODY MASS INDEX: 46.98 KG/M2 | OXYGEN SATURATION: 94 % | TEMPERATURE: 96.8 F | DIASTOLIC BLOOD PRESSURE: 62 MMHG | HEART RATE: 58 BPM | WEIGHT: 265.21 LBS

## 2019-01-01 DIAGNOSIS — I10 ESSENTIAL HYPERTENSION: ICD-10-CM

## 2019-01-01 DIAGNOSIS — R60.0 LOCALIZED EDEMA: ICD-10-CM

## 2019-01-01 DIAGNOSIS — E78.2 MIXED HYPERLIPIDEMIA: ICD-10-CM

## 2019-01-01 DIAGNOSIS — M05.79 RHEUMATOID ARTHRITIS INVOLVING MULTIPLE SITES WITH POSITIVE RHEUMATOID FACTOR (HCC): ICD-10-CM

## 2019-01-01 DIAGNOSIS — F41.9 ANXIETY: ICD-10-CM

## 2019-01-01 DIAGNOSIS — I10 BENIGN ESSENTIAL HYPERTENSION: ICD-10-CM

## 2019-01-01 DIAGNOSIS — A41.9 SEPSIS (HCC): ICD-10-CM

## 2019-01-01 DIAGNOSIS — E78.5 DYSLIPIDEMIA: ICD-10-CM

## 2019-01-01 DIAGNOSIS — G47.33 OBSTRUCTIVE SLEEP APNEA: ICD-10-CM

## 2019-01-01 DIAGNOSIS — M72.6 NECROTIZING FASCIITIS (HCC): Primary | ICD-10-CM

## 2019-01-01 DIAGNOSIS — Z00.00 MEDICARE ANNUAL WELLNESS VISIT, SUBSEQUENT: ICD-10-CM

## 2019-01-01 DIAGNOSIS — L40.9 SCALP PSORIASIS: ICD-10-CM

## 2019-01-01 DIAGNOSIS — E11.40 TYPE 2 DIABETES MELLITUS WITH DIABETIC NEUROPATHY, WITHOUT LONG-TERM CURRENT USE OF INSULIN (HCC): Primary | ICD-10-CM

## 2019-01-01 DIAGNOSIS — Z23 ENCOUNTER FOR IMMUNIZATION: ICD-10-CM

## 2019-01-01 DIAGNOSIS — R41.82 ALTERED MENTAL STATUS: ICD-10-CM

## 2019-01-01 DIAGNOSIS — R80.9 MICROALBUMINURIA: ICD-10-CM

## 2019-01-01 DIAGNOSIS — N30.00 ACUTE CYSTITIS WITHOUT HEMATURIA: Primary | ICD-10-CM

## 2019-01-01 LAB
ABO GROUP BLD BPU: NORMAL
ABO GROUP BLD: NORMAL
ABO GROUP BLD: NORMAL
ALBUMIN SERPL BCP-MCNC: 1.1 G/DL (ref 3.5–5)
ALBUMIN SERPL-MCNC: 3.3 G/DL (ref 3.6–5.1)
ALBUMIN SERPL-MCNC: 3.6 G/DL (ref 3.6–5.1)
ALBUMIN/GLOB SERPL: 1 (CALC) (ref 1–2.5)
ALBUMIN/GLOB SERPL: 1.2 (CALC) (ref 1–2.5)
ALP SERPL-CCNC: 100 U/L (ref 33–130)
ALP SERPL-CCNC: 110 U/L (ref 33–130)
ALP SERPL-CCNC: 155 U/L (ref 46–116)
ALT SERPL W P-5'-P-CCNC: 27 U/L (ref 12–78)
ALT SERPL-CCNC: 5 U/L (ref 6–29)
ALT SERPL-CCNC: 6 U/L (ref 6–29)
ANION GAP BLD CALC-SCNC: 17 MMOL/L (ref 4–13)
ANION GAP SERPL CALCULATED.3IONS-SCNC: 16 MMOL/L (ref 4–13)
ANION GAP SERPL CALCULATED.3IONS-SCNC: 18 MMOL/L (ref 4–13)
ANION GAP SERPL CALCULATED.3IONS-SCNC: 20 MMOL/L (ref 4–13)
ANION GAP SERPL CALCULATED.3IONS-SCNC: 24 MMOL/L (ref 4–13)
ANION GAP SERPL CALCULATED.3IONS-SCNC: 24 MMOL/L (ref 4–13)
ANISOCYTOSIS BLD QL SMEAR: PRESENT
APPEARANCE UR: CLEAR
APTT PPP: 36 SECONDS (ref 23–37)
APTT PPP: 49 SECONDS (ref 23–37)
AST SERPL W P-5'-P-CCNC: 49 U/L (ref 5–45)
AST SERPL-CCNC: 10 U/L (ref 10–35)
AST SERPL-CCNC: 11 U/L (ref 10–35)
BACTERIA UR CULT: NORMAL
BACTERIA UR QL AUTO: ABNORMAL /HPF
BACTERIA UR QL AUTO: ABNORMAL /HPF
BASE EXCESS BLDA CALC-SCNC: -17 MMOL/L (ref -2–3)
BASE EXCESS BLDA CALC-SCNC: -19 MMOL/L
BASE EXCESS BLDA CALC-SCNC: -19.2 MMOL/L
BASE EXCESS BLDA CALC-SCNC: -21 MMOL/L (ref -2–3)
BASE EXCESS BLDA CALC-SCNC: -26.7 MMOL/L
BASE EXCESS BLDA CALC-SCNC: -5 MMOL/L (ref -2–3)
BASE EXCESS BLDA CALC-SCNC: -5 MMOL/L (ref -2–3)
BASOPHILS # BLD AUTO: 40 CELLS/UL (ref 0–200)
BASOPHILS # BLD AUTO: 53 CELLS/UL (ref 0–200)
BASOPHILS # BLD MANUAL: 0 THOUSAND/UL (ref 0–0.1)
BASOPHILS NFR BLD AUTO: 0.6 %
BASOPHILS NFR BLD AUTO: 0.7 %
BASOPHILS NFR MAR MANUAL: 0 % (ref 0–1)
BILIRUB SERPL-MCNC: 0.4 MG/DL (ref 0.2–1.2)
BILIRUB SERPL-MCNC: 0.4 MG/DL (ref 0.2–1.2)
BILIRUB SERPL-MCNC: 0.58 MG/DL (ref 0.2–1)
BILIRUB UR QL STRIP: NEGATIVE
BILIRUB UR QL STRIP: NEGATIVE
BLASTS NFR BLD MANUAL: 1 %
BLASTS NFR BLD MANUAL: 2 %
BLD GP AB SCN SERPL QL: NEGATIVE
BLD GP AB SCN SERPL QL: NEGATIVE
BPU ID: NORMAL
BUN BLD-MCNC: 80 MG/DL (ref 5–25)
BUN SERPL-MCNC: 35 MG/DL (ref 7–25)
BUN SERPL-MCNC: 43 MG/DL (ref 7–25)
BUN SERPL-MCNC: 66 MG/DL (ref 5–25)
BUN SERPL-MCNC: 67 MG/DL (ref 5–25)
BUN SERPL-MCNC: 72 MG/DL (ref 5–25)
BUN SERPL-MCNC: 73 MG/DL (ref 5–25)
BUN SERPL-MCNC: 73 MG/DL (ref 5–25)
BUN/CREAT SERPL: 28 (CALC) (ref 6–22)
BUN/CREAT SERPL: 29 (CALC) (ref 6–22)
BURR CELLS BLD QL SMEAR: PRESENT
CA-I BLD-SCNC: 1.01 MMOL/L (ref 1.12–1.32)
CA-I BLD-SCNC: 1.03 MMOL/L (ref 1.12–1.32)
CA-I BLD-SCNC: 1.26 MMOL/L (ref 1.12–1.32)
CA-I BLD-SCNC: 1.42 MMOL/L (ref 1.12–1.32)
CA-I BLD-SCNC: 1.6 MMOL/L (ref 1.12–1.32)
CALCIUM SERPL-MCNC: 6.9 MG/DL (ref 8.3–10.1)
CALCIUM SERPL-MCNC: 7.6 MG/DL (ref 8.3–10.1)
CALCIUM SERPL-MCNC: 7.7 MG/DL (ref 8.3–10.1)
CALCIUM SERPL-MCNC: 8.5 MG/DL (ref 8.3–10.1)
CALCIUM SERPL-MCNC: 8.8 MG/DL (ref 8.6–10.4)
CALCIUM SERPL-MCNC: 9 MG/DL (ref 8.3–10.1)
CALCIUM SERPL-MCNC: 9 MG/DL (ref 8.6–10.4)
CAOX CRY URNS QL MICRO: ABNORMAL /HPF
CHLORIDE BLD-SCNC: 113 MMOL/L (ref 100–108)
CHLORIDE SERPL-SCNC: 111 MMOL/L (ref 98–110)
CHLORIDE SERPL-SCNC: 112 MMOL/L (ref 98–110)
CHLORIDE SERPL-SCNC: 115 MMOL/L (ref 100–108)
CHLORIDE SERPL-SCNC: 115 MMOL/L (ref 100–108)
CHLORIDE SERPL-SCNC: 116 MMOL/L (ref 100–108)
CHLORIDE SERPL-SCNC: 116 MMOL/L (ref 100–108)
CHLORIDE SERPL-SCNC: 117 MMOL/L (ref 100–108)
CHOLEST SERPL-MCNC: 151 MG/DL
CHOLEST SERPL-MCNC: 160 MG/DL
CHOLEST/HDLC SERPL: 3.1 (CALC)
CHOLEST/HDLC SERPL: 3.3 (CALC)
CK MB SERPL-MCNC: 3.4 % (ref 0–2.5)
CK MB SERPL-MCNC: 5.6 NG/ML (ref 0–5)
CK SERPL-CCNC: 166 U/L (ref 26–192)
CLARITY UR: ABNORMAL
CO2 SERPL-SCNC: 10 MMOL/L (ref 21–32)
CO2 SERPL-SCNC: 11 MMOL/L (ref 21–32)
CO2 SERPL-SCNC: 14 MMOL/L (ref 21–32)
CO2 SERPL-SCNC: 23 MMOL/L (ref 20–32)
CO2 SERPL-SCNC: 23 MMOL/L (ref 20–32)
CO2 SERPL-SCNC: 7 MMOL/L (ref 21–32)
CO2 SERPL-SCNC: 8 MMOL/L (ref 21–32)
COLOR UR: ABNORMAL
COLOR UR: YELLOW
CREAT BLD-MCNC: 2.4 MG/DL (ref 0.6–1.3)
CREAT SERPL-MCNC: 1.23 MG/DL (ref 0.6–0.88)
CREAT SERPL-MCNC: 1.49 MG/DL (ref 0.6–0.88)
CREAT SERPL-MCNC: 2.11 MG/DL (ref 0.6–1.3)
CREAT SERPL-MCNC: 2.12 MG/DL (ref 0.6–1.3)
CREAT SERPL-MCNC: 2.16 MG/DL (ref 0.6–1.3)
CREAT SERPL-MCNC: 2.21 MG/DL (ref 0.6–1.3)
CREAT SERPL-MCNC: 2.34 MG/DL (ref 0.6–1.3)
CROSSMATCH: NORMAL
EOSINOPHIL # BLD AUTO: 194 CELLS/UL (ref 15–500)
EOSINOPHIL # BLD AUTO: 211 CELLS/UL (ref 15–500)
EOSINOPHIL # BLD MANUAL: 0 THOUSAND/UL (ref 0–0.4)
EOSINOPHIL # BLD MANUAL: 0 THOUSAND/UL (ref 0–0.4)
EOSINOPHIL # BLD MANUAL: 0.03 THOUSAND/UL (ref 0–0.4)
EOSINOPHIL # BLD MANUAL: 0.06 THOUSAND/UL (ref 0–0.4)
EOSINOPHIL NFR BLD AUTO: 2.4 %
EOSINOPHIL NFR BLD AUTO: 3.4 %
EOSINOPHIL NFR BLD MANUAL: 0 % (ref 0–6)
EOSINOPHIL NFR BLD MANUAL: 0 % (ref 0–6)
EOSINOPHIL NFR BLD MANUAL: 1 % (ref 0–6)
EOSINOPHIL NFR BLD MANUAL: 1 % (ref 0–6)
ERYTHROCYTE [DISTWIDTH] IN BLOOD BY AUTOMATED COUNT: 13.8 % (ref 11–15)
ERYTHROCYTE [DISTWIDTH] IN BLOOD BY AUTOMATED COUNT: 14.3 % (ref 11–15)
ERYTHROCYTE [DISTWIDTH] IN BLOOD BY AUTOMATED COUNT: 14.6 % (ref 11.6–15.1)
ERYTHROCYTE [DISTWIDTH] IN BLOOD BY AUTOMATED COUNT: 14.8 % (ref 11.6–15.1)
ERYTHROCYTE [DISTWIDTH] IN BLOOD BY AUTOMATED COUNT: 15.1 % (ref 11.6–15.1)
ERYTHROCYTE [DISTWIDTH] IN BLOOD BY AUTOMATED COUNT: 15.6 % (ref 11.6–15.1)
EST. AVERAGE GLUCOSE BLD GHB EST-MCNC: 111 (CALC)
EST. AVERAGE GLUCOSE BLD GHB EST-SCNC: 6.2 (CALC)
GFR SERPL CREATININE-BSD FRML MDRD: 18 ML/MIN/1.73SQ M
GFR SERPL CREATININE-BSD FRML MDRD: 19 ML/MIN/1.73SQ M
GFR SERPL CREATININE-BSD FRML MDRD: 20 ML/MIN/1.73SQ M
GFR SERPL CREATININE-BSD FRML MDRD: 20 ML/MIN/1.73SQ M
GFR SERPL CREATININE-BSD FRML MDRD: 21 ML/MIN/1.73SQ M
GFR SERPL CREATININE-BSD FRML MDRD: 21 ML/MIN/1.73SQ M
GIANT PLATELETS BLD QL SMEAR: PRESENT
GLOBULIN SER CALC-MCNC: 2.9 G/DL (CALC) (ref 1.9–3.7)
GLOBULIN SER CALC-MCNC: 3.2 G/DL (CALC) (ref 1.9–3.7)
GLUCOSE SERPL-MCNC: 104 MG/DL (ref 65–140)
GLUCOSE SERPL-MCNC: 106 MG/DL (ref 65–140)
GLUCOSE SERPL-MCNC: 109 MG/DL (ref 65–140)
GLUCOSE SERPL-MCNC: 111 MG/DL (ref 65–99)
GLUCOSE SERPL-MCNC: 112 MG/DL (ref 65–140)
GLUCOSE SERPL-MCNC: 135 MG/DL (ref 65–99)
GLUCOSE SERPL-MCNC: 16 MG/DL (ref 65–140)
GLUCOSE SERPL-MCNC: 165 MG/DL (ref 65–140)
GLUCOSE SERPL-MCNC: 55 MG/DL (ref 65–140)
GLUCOSE SERPL-MCNC: 96 MG/DL (ref 65–140)
GLUCOSE SERPL-MCNC: 97 MG/DL (ref 65–140)
GLUCOSE SERPL-MCNC: 98 MG/DL (ref 65–140)
GLUCOSE SERPL-MCNC: <20 MG/DL (ref 65–140)
GLUCOSE UR QL STRIP: NEGATIVE
GLUCOSE UR STRIP-MCNC: NEGATIVE MG/DL
HBA1C MFR BLD: 5.5 % OF TOTAL HGB
HCO3 BLDA-SCNC: 13.1 MMOL/L (ref 22–28)
HCO3 BLDA-SCNC: 22.4 MMOL/L (ref 22–28)
HCO3 BLDA-SCNC: 23.2 MMOL/L (ref 22–28)
HCO3 BLDA-SCNC: 4.7 MMOL/L (ref 22–28)
HCO3 BLDA-SCNC: 8.7 MMOL/L (ref 22–28)
HCO3 BLDA-SCNC: 9 MMOL/L (ref 24–30)
HCO3 BLDA-SCNC: 9.2 MMOL/L (ref 22–28)
HCT VFR BLD AUTO: 23.4 % (ref 34.8–46.1)
HCT VFR BLD AUTO: 24.3 % (ref 34.8–46.1)
HCT VFR BLD AUTO: 25.8 % (ref 34.8–46.1)
HCT VFR BLD AUTO: 29.8 % (ref 34.8–46.1)
HCT VFR BLD AUTO: 32.3 % (ref 35–45)
HCT VFR BLD AUTO: 34.8 % (ref 35–45)
HCT VFR BLD CALC: 16 % (ref 34.8–46.1)
HCT VFR BLD CALC: 20 % (ref 34.8–46.1)
HCT VFR BLD CALC: 22 % (ref 34.8–46.1)
HCT VFR BLD CALC: 25 % (ref 34.8–46.1)
HCT VFR BLD CALC: 26 % (ref 34.8–46.1)
HDLC SERPL-MCNC: 48 MG/DL
HDLC SERPL-MCNC: 49 MG/DL
HGB BLD-MCNC: 10.4 G/DL (ref 11.7–15.5)
HGB BLD-MCNC: 11.1 G/DL (ref 11.7–15.5)
HGB BLD-MCNC: 7 G/DL (ref 11.5–15.4)
HGB BLD-MCNC: 7.2 G/DL (ref 11.5–15.4)
HGB BLD-MCNC: 7.9 G/DL (ref 11.5–15.4)
HGB BLD-MCNC: 8.7 G/DL (ref 11.5–15.4)
HGB BLDA-MCNC: 5.4 G/DL (ref 11.5–15.4)
HGB BLDA-MCNC: 6.8 G/DL (ref 11.5–15.4)
HGB BLDA-MCNC: 7.5 G/DL (ref 11.5–15.4)
HGB BLDA-MCNC: 8.5 G/DL (ref 11.5–15.4)
HGB BLDA-MCNC: 8.8 G/DL (ref 11.5–15.4)
HGB UR QL STRIP.AUTO: NEGATIVE
HGB UR QL STRIP: ABNORMAL
HOROWITZ INDEX BLDA+IHG-RTO: 100 MM[HG]
HOROWITZ INDEX BLDA+IHG-RTO: 200 MM[HG]
HOROWITZ INDEX BLDA+IHG-RTO: 60 MM[HG]
HYALINE CASTS #/AREA URNS LPF: ABNORMAL /LPF
HYALINE CASTS #/AREA URNS LPF: ABNORMAL /LPF
INR PPP: 1.59 (ref 0.84–1.19)
INR PPP: 2.2 (ref 0.84–1.19)
INR PPP: 2.36 (ref 0.84–1.19)
INR PPP: 2.94 (ref 0.84–1.19)
KETONES UR QL STRIP: NEGATIVE
KETONES UR STRIP-MCNC: NEGATIVE MG/DL
LACTATE SERPL-SCNC: 13.4 MMOL/L (ref 0.5–2)
LACTATE SERPL-SCNC: 14.2 MMOL/L (ref 0.5–2)
LACTATE SERPL-SCNC: 17.6 MMOL/L (ref 0.5–2)
LACTATE SERPL-SCNC: 8.9 MMOL/L (ref 0.5–2)
LDLC SERPL CALC-MCNC: 84 MG/DL (CALC)
LDLC SERPL CALC-MCNC: 91 MG/DL (CALC)
LEUKOCYTE ESTERASE UR QL STRIP: ABNORMAL
LEUKOCYTE ESTERASE UR QL STRIP: ABNORMAL
LG PLATELETS BLD QL SMEAR: PRESENT
LYMPHOCYTES # BLD AUTO: 0.27 THOUSAND/UL (ref 0.6–4.47)
LYMPHOCYTES # BLD AUTO: 0.28 THOUSAND/UL (ref 0.6–4.47)
LYMPHOCYTES # BLD AUTO: 0.37 THOUSAND/UL (ref 0.6–4.47)
LYMPHOCYTES # BLD AUTO: 1.52 THOUSAND/UL (ref 0.6–4.47)
LYMPHOCYTES # BLD AUTO: 1065 CELLS/UL (ref 850–3900)
LYMPHOCYTES # BLD AUTO: 16 % (ref 14–44)
LYMPHOCYTES # BLD AUTO: 24 % (ref 14–44)
LYMPHOCYTES # BLD AUTO: 724 CELLS/UL (ref 850–3900)
LYMPHOCYTES # BLD AUTO: 8 % (ref 14–44)
LYMPHOCYTES # BLD AUTO: 9 % (ref 14–44)
LYMPHOCYTES NFR BLD AUTO: 12.1 %
LYMPHOCYTES NFR BLD AUTO: 12.7 %
MCH RBC QN AUTO: 27.3 PG (ref 26.8–34.3)
MCH RBC QN AUTO: 27.4 PG (ref 26.8–34.3)
MCH RBC QN AUTO: 28.3 PG (ref 26.8–34.3)
MCH RBC QN AUTO: 28.3 PG (ref 26.8–34.3)
MCH RBC QN AUTO: 29.4 PG (ref 27–33)
MCH RBC QN AUTO: 30.1 PG (ref 27–33)
MCHC RBC AUTO-ENTMCNC: 29.2 G/DL (ref 31.4–37.4)
MCHC RBC AUTO-ENTMCNC: 29.6 G/DL (ref 31.4–37.4)
MCHC RBC AUTO-ENTMCNC: 29.9 G/DL (ref 31.4–37.4)
MCHC RBC AUTO-ENTMCNC: 30.6 G/DL (ref 31.4–37.4)
MCHC RBC AUTO-ENTMCNC: 31.9 G/DL (ref 32–36)
MCHC RBC AUTO-ENTMCNC: 32.2 G/DL (ref 32–36)
MCV RBC AUTO: 92 FL (ref 82–98)
MCV RBC AUTO: 92.3 FL (ref 80–100)
MCV RBC AUTO: 93 FL (ref 82–98)
MCV RBC AUTO: 93.6 FL (ref 80–100)
MCV RBC AUTO: 94 FL (ref 82–98)
MCV RBC AUTO: 95 FL (ref 82–98)
METAMYELOCYTES NFR BLD MANUAL: 1 % (ref 0–1)
METAMYELOCYTES NFR BLD MANUAL: 3 % (ref 0–1)
METAMYELOCYTES NFR BLD MANUAL: 5 % (ref 0–1)
MONOCYTES # BLD AUTO: 0.17 THOUSAND/UL (ref 0–1.22)
MONOCYTES # BLD AUTO: 0.21 THOUSAND/UL (ref 0–1.22)
MONOCYTES # BLD AUTO: 0.25 THOUSAND/UL (ref 0–1.22)
MONOCYTES # BLD AUTO: 0.49 THOUSAND/UL (ref 0–1.22)
MONOCYTES # BLD AUTO: 513 CELLS/UL (ref 200–950)
MONOCYTES # BLD AUTO: 845 CELLS/UL (ref 200–950)
MONOCYTES NFR BLD AUTO: 9 %
MONOCYTES NFR BLD AUTO: 9.6 %
MONOCYTES NFR BLD: 16 % (ref 4–12)
MONOCYTES NFR BLD: 4 % (ref 4–12)
MONOCYTES NFR BLD: 5 % (ref 4–12)
MONOCYTES NFR BLD: 9 % (ref 4–12)
MYELOCYTES NFR BLD MANUAL: 1 % (ref 0–1)
MYELOCYTES NFR BLD MANUAL: 2 % (ref 0–1)
MYELOCYTES NFR BLD MANUAL: 4 % (ref 0–1)
NEUTROPHILS # BLD AUTO: 4229 CELLS/UL (ref 1500–7800)
NEUTROPHILS # BLD AUTO: 6626 CELLS/UL (ref 1500–7800)
NEUTROPHILS # BLD MANUAL: 1.68 THOUSAND/UL (ref 1.85–7.62)
NEUTROPHILS # BLD MANUAL: 2.13 THOUSAND/UL (ref 1.85–7.62)
NEUTROPHILS # BLD MANUAL: 2.63 THOUSAND/UL (ref 1.85–7.62)
NEUTROPHILS # BLD MANUAL: 3.54 THOUSAND/UL (ref 1.85–7.62)
NEUTROPHILS NFR BLD AUTO: 74.2 %
NEUTROPHILS NFR BLD AUTO: 75.3 %
NEUTS BAND NFR BLD MANUAL: 2 % (ref 0–8)
NEUTS BAND NFR BLD MANUAL: 2 % (ref 0–8)
NEUTS BAND NFR BLD MANUAL: 30 % (ref 0–8)
NEUTS BAND NFR BLD MANUAL: 6 % (ref 0–8)
NEUTS SEG NFR BLD AUTO: 49 % (ref 43–75)
NEUTS SEG NFR BLD AUTO: 50 % (ref 43–75)
NEUTS SEG NFR BLD AUTO: 67 % (ref 43–75)
NEUTS SEG NFR BLD AUTO: 70 % (ref 43–75)
NITRITE UR QL STRIP: NEGATIVE
NITRITE UR QL STRIP: POSITIVE
NON-SQ EPI CELLS URNS QL MICRO: ABNORMAL /HPF
NONHDLC SERPL-MCNC: 103 MG/DL (CALC)
NONHDLC SERPL-MCNC: 111 MG/DL (CALC)
NRBC BLD AUTO-RTO: 11 /100 WBCS
NRBC BLD AUTO-RTO: 19 /100 WBCS
NRBC BLD AUTO-RTO: 22 /100 WBCS
NRBC BLD AUTO-RTO: 4 /100 WBCS
NRBC BLD AUTO-RTO: 54 /100 WBC (ref 0–2)
NRBC BLD AUTO-RTO: 56 /100 WBC (ref 0–2)
O2 CT BLDA-SCNC: 10.3 ML/DL (ref 16–23)
O2 CT BLDA-SCNC: 11.9 ML/DL (ref 16–23)
O2 CT BLDA-SCNC: 12 ML/DL (ref 16–23)
OTHER STN SPEC: ABNORMAL
OXYHGB MFR BLDA: 96.3 % (ref 94–97)
OXYHGB MFR BLDA: 97.6 % (ref 94–97)
OXYHGB MFR BLDA: 98.1 % (ref 94–97)
PATHOLOGIST INTERPRETATION: NORMAL
PATHOLOGY REVIEW: YES
PCO2 BLD: 10 MMOL/L (ref 21–32)
PCO2 BLD: 12 MMOL/L (ref 21–32)
PCO2 BLD: 15 MMOL/L (ref 21–32)
PCO2 BLD: 24 MMOL/L (ref 21–32)
PCO2 BLD: 25 MMOL/L (ref 21–32)
PCO2 BLD: 35.5 MM HG (ref 42–50)
PCO2 BLD: 54.8 MM HG (ref 36–44)
PCO2 BLD: 55 MM HG (ref 36–44)
PCO2 BLD: 62.3 MM HG (ref 36–44)
PCO2 BLDA: 26.8 MM HG (ref 36–44)
PCO2 BLDA: 27 MM HG (ref 36–44)
PCO2 BLDA: 31.2 MM HG (ref 36–44)
PEEP RESPIRATORY: 5 CM[H2O]
PELGER HUET CELLS BLD QL SMEAR: PRESENT
PH BLD: 6.99 [PH] (ref 7.35–7.45)
PH BLD: 7.01 [PH] (ref 7.3–7.4)
PH BLD: 7.18 [PH] (ref 7.35–7.45)
PH BLD: 7.22 [PH] (ref 7.35–7.45)
PH BLDA: 6.86 [PH] (ref 7.35–7.45)
PH BLDA: 7.09 [PH] (ref 7.35–7.45)
PH BLDA: 7.13 [PH] (ref 7.35–7.45)
PH UR STRIP.AUTO: 5 [PH]
PH UR STRIP: 5.5 [PH] (ref 5–8)
PLATELET # BLD AUTO: 107 THOUSANDS/UL (ref 149–390)
PLATELET # BLD AUTO: 136 THOUSANDS/UL (ref 149–390)
PLATELET # BLD AUTO: 167 THOUSAND/UL (ref 140–400)
PLATELET # BLD AUTO: 183 THOUSAND/UL (ref 140–400)
PLATELET # BLD AUTO: 183 THOUSANDS/UL (ref 149–390)
PLATELET # BLD AUTO: 93 THOUSANDS/UL (ref 149–390)
PLATELET BLD QL SMEAR: ABNORMAL
PLATELET BLD QL SMEAR: ABNORMAL
PLATELET BLD QL SMEAR: ADEQUATE
PLATELET BLD QL SMEAR: ADEQUATE
PLATELET CLUMP BLD QL SMEAR: PRESENT
PMV BLD AUTO: 10.2 FL (ref 8.9–12.7)
PMV BLD AUTO: 10.3 FL (ref 8.9–12.7)
PMV BLD AUTO: 10.5 FL (ref 8.9–12.7)
PMV BLD AUTO: 10.5 FL (ref 8.9–12.7)
PMV BLD REES-ECKER: 10 FL (ref 7.5–12.5)
PMV BLD REES-ECKER: 10.4 FL (ref 7.5–12.5)
PO2 BLD: 131 MM HG (ref 75–129)
PO2 BLD: 62 MM HG (ref 35–45)
PO2 BLD: 67 MM HG (ref 75–129)
PO2 BLD: 99 MM HG (ref 75–129)
PO2 BLDA: 155.9 MM HG (ref 75–129)
PO2 BLDA: 201.1 MM HG (ref 75–129)
PO2 BLDA: 233.4 MM HG (ref 75–129)
POIKILOCYTOSIS BLD QL SMEAR: PRESENT
POIKILOCYTOSIS BLD QL SMEAR: PRESENT
POLYCHROMASIA BLD QL SMEAR: PRESENT
POTASSIUM BLD-SCNC: 4.4 MMOL/L (ref 3.5–5.3)
POTASSIUM BLD-SCNC: 5 MMOL/L (ref 3.5–5.3)
POTASSIUM BLD-SCNC: 5 MMOL/L (ref 3.5–5.3)
POTASSIUM BLD-SCNC: 5.1 MMOL/L (ref 3.5–5.3)
POTASSIUM BLD-SCNC: 5.1 MMOL/L (ref 3.5–5.3)
POTASSIUM SERPL-SCNC: 4.2 MMOL/L (ref 3.5–5.3)
POTASSIUM SERPL-SCNC: 4.7 MMOL/L (ref 3.5–5.3)
POTASSIUM SERPL-SCNC: 4.8 MMOL/L (ref 3.5–5.3)
POTASSIUM SERPL-SCNC: 4.9 MMOL/L (ref 3.5–5.3)
POTASSIUM SERPL-SCNC: 5.3 MMOL/L (ref 3.5–5.3)
POTASSIUM SERPL-SCNC: 6.8 MMOL/L (ref 3.5–5.3)
POTASSIUM SERPL-SCNC: 7.3 MMOL/L (ref 3.5–5.3)
PROCALCITONIN SERPL-MCNC: 4.4 NG/ML
PROMYELOCYTES NFR BLD MANUAL: 2 % (ref 0–0)
PROMYELOCYTES NFR BLD MANUAL: 3 % (ref 0–0)
PROMYELOCYTES NFR BLD MANUAL: 4 % (ref 0–0)
PROT SERPL-MCNC: 5.7 G/DL (ref 6.4–8.2)
PROT SERPL-MCNC: 6.5 G/DL (ref 6.1–8.1)
PROT SERPL-MCNC: 6.5 G/DL (ref 6.1–8.1)
PROT UR QL STRIP: NEGATIVE
PROT UR STRIP-MCNC: NEGATIVE MG/DL
PROTHROMBIN TIME: 18.5 SECONDS (ref 11.6–14.5)
PROTHROMBIN TIME: 23.9 SECONDS (ref 11.6–14.5)
PROTHROMBIN TIME: 25.3 SECONDS (ref 11.6–14.5)
PROTHROMBIN TIME: 30.1 SECONDS (ref 11.6–14.5)
RBC # BLD AUTO: 2.47 MILLION/UL (ref 3.81–5.12)
RBC # BLD AUTO: 2.64 MILLION/UL (ref 3.81–5.12)
RBC # BLD AUTO: 2.79 MILLION/UL (ref 3.81–5.12)
RBC # BLD AUTO: 3.17 MILLION/UL (ref 3.81–5.12)
RBC # BLD AUTO: 3.45 MILLION/UL (ref 3.8–5.1)
RBC # BLD AUTO: 3.77 MILLION/UL (ref 3.8–5.1)
RBC #/AREA URNS AUTO: ABNORMAL /HPF
RBC #/AREA URNS HPF: ABNORMAL /HPF
RBC MORPH BLD: PRESENT
RH BLD: POSITIVE
RH BLD: POSITIVE
SAO2 % BLD FROM PO2: 77 % (ref 60–85)
SAO2 % BLD FROM PO2: 79 % (ref 60–85)
SAO2 % BLD FROM PO2: 95 % (ref 60–85)
SAO2 % BLD FROM PO2: 98 % (ref 60–85)
SL AMB EGFR AFRICAN AMERICAN: 37 ML/MIN/1.73M2
SL AMB EGFR AFRICAN AMERICAN: 47 ML/MIN/1.73M2
SL AMB EGFR NON AFRICAN AMERICAN: 32 ML/MIN/1.73M2
SL AMB EGFR NON AFRICAN AMERICAN: 40 ML/MIN/1.73M2
SMUDGE CELLS BLD QL SMEAR: PRESENT
SODIUM BLD-SCNC: 136 MMOL/L (ref 136–145)
SODIUM BLD-SCNC: 138 MMOL/L (ref 136–145)
SODIUM BLD-SCNC: 141 MMOL/L (ref 136–145)
SODIUM BLD-SCNC: 145 MMOL/L (ref 136–145)
SODIUM BLD-SCNC: 146 MMOL/L (ref 136–145)
SODIUM SERPL-SCNC: 142 MMOL/L (ref 135–146)
SODIUM SERPL-SCNC: 142 MMOL/L (ref 136–145)
SODIUM SERPL-SCNC: 143 MMOL/L (ref 135–146)
SODIUM SERPL-SCNC: 146 MMOL/L (ref 136–145)
SODIUM SERPL-SCNC: 147 MMOL/L (ref 136–145)
SODIUM SERPL-SCNC: 147 MMOL/L (ref 136–145)
SODIUM SERPL-SCNC: 149 MMOL/L (ref 136–145)
SP GR UR STRIP.AUTO: 1.01 (ref 1–1.03)
SP GR UR STRIP: 1.01 (ref 1–1.03)
SPECIMEN EXPIRATION DATE: NORMAL
SPECIMEN EXPIRATION DATE: NORMAL
SPECIMEN SOURCE: ABNORMAL
SQUAMOUS #/AREA URNS HPF: ABNORMAL /HPF
T4 FREE SERPL-MCNC: 0.85 NG/DL (ref 0.76–1.46)
TOTAL CELLS COUNTED SPEC: 100
TOXIC GRANULES BLD QL SMEAR: PRESENT
TOXIC GRANULES BLD QL SMEAR: PRESENT
TRIGL SERPL-MCNC: 100 MG/DL
TRIGL SERPL-MCNC: 99 MG/DL
TROPONIN I SERPL-MCNC: 0.04 NG/ML
TROPONIN I SERPL-MCNC: 0.36 NG/ML
TSH SERPL DL<=0.05 MIU/L-ACNC: 2.64 UIU/ML (ref 0.36–3.74)
TSH SERPL-ACNC: 3.34 MIU/L (ref 0.4–4.5)
TSH SERPL-ACNC: 3.98 MIU/L (ref 0.4–4.5)
UNIT DISPENSE STATUS: NORMAL
UNIT PRODUCT CODE: NORMAL
UNIT RH: NORMAL
UROBILINOGEN UR QL STRIP.AUTO: 0.2 E.U./DL
VARIANT LYMPHS # BLD AUTO: 3 %
VENT AC: 16
VENT- AC: AC
VT SETTING VENT: 500 ML
WBC # BLD AUTO: 2.34 THOUSAND/UL (ref 4.31–10.16)
WBC # BLD AUTO: 3.09 THOUSAND/UL (ref 4.31–10.16)
WBC # BLD AUTO: 3.33 THOUSAND/UL (ref 4.31–10.16)
WBC # BLD AUTO: 5.7 THOUSAND/UL (ref 3.8–10.8)
WBC # BLD AUTO: 6.33 THOUSAND/UL (ref 4.31–10.16)
WBC # BLD AUTO: 8.8 THOUSAND/UL (ref 3.8–10.8)
WBC #/AREA URNS AUTO: ABNORMAL /HPF
WBC #/AREA URNS HPF: ABNORMAL /HPF
WBC TOXIC VACUOLES BLD QL SMEAR: PRESENT

## 2019-01-01 PROCEDURE — P9016 RBC LEUKOCYTES REDUCED: HCPCS

## 2019-01-01 PROCEDURE — 36415 COLL VENOUS BLD VENIPUNCTURE: CPT | Performed by: EMERGENCY MEDICINE

## 2019-01-01 PROCEDURE — 87186 SC STD MICRODIL/AGAR DIL: CPT | Performed by: EMERGENCY MEDICINE

## 2019-01-01 PROCEDURE — 82330 ASSAY OF CALCIUM: CPT

## 2019-01-01 PROCEDURE — 85027 COMPLETE CBC AUTOMATED: CPT | Performed by: SURGERY

## 2019-01-01 PROCEDURE — 70450 CT HEAD/BRAIN W/O DYE: CPT

## 2019-01-01 PROCEDURE — 87040 BLOOD CULTURE FOR BACTERIA: CPT | Performed by: EMERGENCY MEDICINE

## 2019-01-01 PROCEDURE — 72125 CT NECK SPINE W/O DYE: CPT

## 2019-01-01 PROCEDURE — 74176 CT ABD & PELVIS W/O CONTRAST: CPT

## 2019-01-01 PROCEDURE — 84484 ASSAY OF TROPONIN QUANT: CPT | Performed by: EMERGENCY MEDICINE

## 2019-01-01 PROCEDURE — 80047 BASIC METABLC PNL IONIZED CA: CPT

## 2019-01-01 PROCEDURE — 82803 BLOOD GASES ANY COMBINATION: CPT

## 2019-01-01 PROCEDURE — 86923 COMPATIBILITY TEST ELECTRIC: CPT

## 2019-01-01 PROCEDURE — NS001 PR NO SIGNATURE OR ATTESTATION: Performed by: SURGERY

## 2019-01-01 PROCEDURE — 73700 CT LOWER EXTREMITY W/O DYE: CPT

## 2019-01-01 PROCEDURE — 84443 ASSAY THYROID STIM HORMONE: CPT | Performed by: EMERGENCY MEDICINE

## 2019-01-01 PROCEDURE — 93005 ELECTROCARDIOGRAM TRACING: CPT

## 2019-01-01 PROCEDURE — 84132 ASSAY OF SERUM POTASSIUM: CPT

## 2019-01-01 PROCEDURE — 83605 ASSAY OF LACTIC ACID: CPT | Performed by: EMERGENCY MEDICINE

## 2019-01-01 PROCEDURE — 71250 CT THORAX DX C-: CPT

## 2019-01-01 PROCEDURE — 82948 REAGENT STRIP/BLOOD GLUCOSE: CPT

## 2019-01-01 PROCEDURE — 4A133B1 MONITORING OF ARTERIAL PRESSURE, PERIPHERAL, PERCUTANEOUS APPROACH: ICD-10-PCS | Performed by: SURGERY

## 2019-01-01 PROCEDURE — 84439 ASSAY OF FREE THYROXINE: CPT | Performed by: EMERGENCY MEDICINE

## 2019-01-01 PROCEDURE — 03HY32Z INSERTION OF MONITORING DEVICE INTO UPPER ARTERY, PERCUTANEOUS APPROACH: ICD-10-PCS | Performed by: SURGERY

## 2019-01-01 PROCEDURE — 71045 X-RAY EXAM CHEST 1 VIEW: CPT

## 2019-01-01 PROCEDURE — 85025 COMPLETE CBC W/AUTO DIFF WBC: CPT | Performed by: EMERGENCY MEDICINE

## 2019-01-01 PROCEDURE — 0JBL0ZZ EXCISION OF RIGHT UPPER LEG SUBCUTANEOUS TISSUE AND FASCIA, OPEN APPROACH: ICD-10-PCS | Performed by: SURGERY

## 2019-01-01 PROCEDURE — 85610 PROTHROMBIN TIME: CPT | Performed by: SURGERY

## 2019-01-01 PROCEDURE — 96367 TX/PROPH/DG ADDL SEQ IV INF: CPT

## 2019-01-01 PROCEDURE — 84295 ASSAY OF SERUM SODIUM: CPT

## 2019-01-01 PROCEDURE — 85610 PROTHROMBIN TIME: CPT | Performed by: EMERGENCY MEDICINE

## 2019-01-01 PROCEDURE — 85027 COMPLETE CBC AUTOMATED: CPT | Performed by: EMERGENCY MEDICINE

## 2019-01-01 PROCEDURE — 96365 THER/PROPH/DIAG IV INF INIT: CPT

## 2019-01-01 PROCEDURE — 82550 ASSAY OF CK (CPK): CPT | Performed by: EMERGENCY MEDICINE

## 2019-01-01 PROCEDURE — 0JBM0ZZ EXCISION OF LEFT UPPER LEG SUBCUTANEOUS TISSUE AND FASCIA, OPEN APPROACH: ICD-10-PCS | Performed by: SURGERY

## 2019-01-01 PROCEDURE — 5A1935Z RESPIRATORY VENTILATION, LESS THAN 24 CONSECUTIVE HOURS: ICD-10-PCS | Performed by: SURGERY

## 2019-01-01 PROCEDURE — 82805 BLOOD GASES W/O2 SATURATION: CPT | Performed by: EMERGENCY MEDICINE

## 2019-01-01 PROCEDURE — 81001 URINALYSIS AUTO W/SCOPE: CPT | Performed by: EMERGENCY MEDICINE

## 2019-01-01 PROCEDURE — NC001 PR NO CHARGE: Performed by: SURGERY

## 2019-01-01 PROCEDURE — 85007 BL SMEAR W/DIFF WBC COUNT: CPT | Performed by: EMERGENCY MEDICINE

## 2019-01-01 PROCEDURE — G0008 ADMIN INFLUENZA VIRUS VAC: HCPCS

## 2019-01-01 PROCEDURE — 99285 EMERGENCY DEPT VISIT HI MDM: CPT | Performed by: EMERGENCY MEDICINE

## 2019-01-01 PROCEDURE — 94002 VENT MGMT INPAT INIT DAY: CPT

## 2019-01-01 PROCEDURE — 80048 BASIC METABOLIC PNL TOTAL CA: CPT | Performed by: EMERGENCY MEDICINE

## 2019-01-01 PROCEDURE — G0439 PPPS, SUBSEQ VISIT: HCPCS | Performed by: NURSE PRACTITIONER

## 2019-01-01 PROCEDURE — 84145 PROCALCITONIN (PCT): CPT | Performed by: EMERGENCY MEDICINE

## 2019-01-01 PROCEDURE — 83605 ASSAY OF LACTIC ACID: CPT | Performed by: SURGERY

## 2019-01-01 PROCEDURE — 02HV33Z INSERTION OF INFUSION DEVICE INTO SUPERIOR VENA CAVA, PERCUTANEOUS APPROACH: ICD-10-PCS | Performed by: EMERGENCY MEDICINE

## 2019-01-01 PROCEDURE — 4A133J1 MONITORING OF ARTERIAL PULSE, PERIPHERAL, PERCUTANEOUS APPROACH: ICD-10-PCS | Performed by: SURGERY

## 2019-01-01 PROCEDURE — 94760 N-INVAS EAR/PLS OXIMETRY 1: CPT

## 2019-01-01 PROCEDURE — 86901 BLOOD TYPING SEROLOGIC RH(D): CPT | Performed by: SURGERY

## 2019-01-01 PROCEDURE — 85007 BL SMEAR W/DIFF WBC COUNT: CPT | Performed by: SURGERY

## 2019-01-01 PROCEDURE — 82805 BLOOD GASES W/O2 SATURATION: CPT | Performed by: SURGERY

## 2019-01-01 PROCEDURE — 87086 URINE CULTURE/COLONY COUNT: CPT | Performed by: EMERGENCY MEDICINE

## 2019-01-01 PROCEDURE — 87077 CULTURE AEROBIC IDENTIFY: CPT | Performed by: EMERGENCY MEDICINE

## 2019-01-01 PROCEDURE — 85014 HEMATOCRIT: CPT

## 2019-01-01 PROCEDURE — 30233N1 TRANSFUSION OF NONAUTOLOGOUS RED BLOOD CELLS INTO PERIPHERAL VEIN, PERCUTANEOUS APPROACH: ICD-10-PCS | Performed by: SURGERY

## 2019-01-01 PROCEDURE — 86850 RBC ANTIBODY SCREEN: CPT | Performed by: SURGERY

## 2019-01-01 PROCEDURE — 11043 DBRDMT MUSC&/FSCA 1ST 20/<: CPT | Performed by: SURGERY

## 2019-01-01 PROCEDURE — 85730 THROMBOPLASTIN TIME PARTIAL: CPT | Performed by: EMERGENCY MEDICINE

## 2019-01-01 PROCEDURE — 99291 CRITICAL CARE FIRST HOUR: CPT | Performed by: SURGERY

## 2019-01-01 PROCEDURE — 99285 EMERGENCY DEPT VISIT HI MDM: CPT

## 2019-01-01 PROCEDURE — 11042 DBRDMT SUBQ TIS 1ST 20SQCM/<: CPT | Performed by: SURGERY

## 2019-01-01 PROCEDURE — 0BH18EZ INSERTION OF ENDOTRACHEAL AIRWAY INTO TRACHEA, VIA NATURAL OR ARTIFICIAL OPENING ENDOSCOPIC: ICD-10-PCS | Performed by: SURGERY

## 2019-01-01 PROCEDURE — P9017 PLASMA 1 DONOR FRZ W/IN 8 HR: HCPCS

## 2019-01-01 PROCEDURE — 85730 THROMBOPLASTIN TIME PARTIAL: CPT | Performed by: SURGERY

## 2019-01-01 PROCEDURE — 82553 CREATINE MB FRACTION: CPT | Performed by: EMERGENCY MEDICINE

## 2019-01-01 PROCEDURE — 82947 ASSAY GLUCOSE BLOOD QUANT: CPT

## 2019-01-01 PROCEDURE — 80053 COMPREHEN METABOLIC PANEL: CPT | Performed by: EMERGENCY MEDICINE

## 2019-01-01 PROCEDURE — 86900 BLOOD TYPING SEROLOGIC ABO: CPT | Performed by: SURGERY

## 2019-01-01 PROCEDURE — 90662 IIV NO PRSV INCREASED AG IM: CPT

## 2019-01-01 PROCEDURE — 80048 BASIC METABOLIC PNL TOTAL CA: CPT | Performed by: SURGERY

## 2019-01-01 PROCEDURE — 30233K1 TRANSFUSION OF NONAUTOLOGOUS FROZEN PLASMA INTO PERIPHERAL VEIN, PERCUTANEOUS APPROACH: ICD-10-PCS | Performed by: SURGERY

## 2019-01-01 PROCEDURE — 99349 HOME/RES VST EST MOD MDM 40: CPT | Performed by: NURSE PRACTITIONER

## 2019-01-01 RX ORDER — SUCCINYLCHOLINE/SOD CL,ISO/PF 100 MG/5ML
SYRINGE (ML) INTRAVENOUS AS NEEDED
Status: DISCONTINUED | OUTPATIENT
Start: 2019-01-01 | End: 2019-01-01 | Stop reason: SURG

## 2019-01-01 RX ORDER — HEPARIN SODIUM 5000 [USP'U]/ML
5000 INJECTION, SOLUTION INTRAVENOUS; SUBCUTANEOUS EVERY 8 HOURS SCHEDULED
Status: DISCONTINUED | OUTPATIENT
Start: 2019-01-01 | End: 2019-01-01

## 2019-01-01 RX ORDER — FUROSEMIDE 40 MG/1
40 TABLET ORAL DAILY
Qty: 30 TABLET | Refills: 5 | Status: SHIPPED | OUTPATIENT
Start: 2019-01-01 | End: 2019-01-01 | Stop reason: HOSPADM

## 2019-01-01 RX ORDER — DEXTROSE MONOHYDRATE 25 G/50ML
25 INJECTION, SOLUTION INTRAVENOUS ONCE
Status: COMPLETED | OUTPATIENT
Start: 2019-01-01 | End: 2019-01-01

## 2019-01-01 RX ORDER — PROPOFOL 10 MG/ML
5-50 INJECTION, EMULSION INTRAVENOUS
Status: DISCONTINUED | OUTPATIENT
Start: 2019-01-01 | End: 2019-01-01

## 2019-01-01 RX ORDER — MAGNESIUM HYDROXIDE 1200 MG/15ML
LIQUID ORAL AS NEEDED
Status: DISCONTINUED | OUTPATIENT
Start: 2019-01-01 | End: 2019-01-01 | Stop reason: HOSPADM

## 2019-01-01 RX ORDER — CIPROFLOXACIN 500 MG/1
500 TABLET, FILM COATED ORAL EVERY 12 HOURS SCHEDULED
Qty: 14 TABLET | Refills: 0 | Status: SHIPPED | OUTPATIENT
Start: 2019-01-01 | End: 2019-01-01

## 2019-01-01 RX ORDER — CALCIUM CHLORIDE 100 MG/ML
1 INJECTION INTRAVENOUS; INTRAVENTRICULAR ONCE
Status: COMPLETED | OUTPATIENT
Start: 2019-01-01 | End: 2019-01-01

## 2019-01-01 RX ORDER — ALPRAZOLAM 0.25 MG/1
0.25 TABLET ORAL 2 TIMES DAILY PRN
Qty: 60 TABLET | Refills: 0 | Status: SHIPPED | OUTPATIENT
Start: 2019-01-01 | End: 2019-01-01 | Stop reason: HOSPADM

## 2019-01-01 RX ORDER — ALPRAZOLAM 0.25 MG/1
0.25 TABLET ORAL 2 TIMES DAILY PRN
Qty: 60 TABLET | Refills: 0 | Status: SHIPPED | OUTPATIENT
Start: 2019-01-01 | End: 2019-01-01 | Stop reason: SDUPTHER

## 2019-01-01 RX ORDER — CLOBETASOL PROPIONATE 0.46 MG/ML
SOLUTION TOPICAL 2 TIMES DAILY
Qty: 50 ML | Refills: 5 | Status: SHIPPED | OUTPATIENT
Start: 2019-01-01 | End: 2019-01-01 | Stop reason: HOSPADM

## 2019-01-01 RX ORDER — ALBUTEROL SULFATE 2.5 MG/3ML
SOLUTION RESPIRATORY (INHALATION)
Status: COMPLETED
Start: 2019-01-01 | End: 2019-01-01

## 2019-01-01 RX ORDER — ALPRAZOLAM 0.25 MG/1
0.25 TABLET ORAL 2 TIMES DAILY PRN
Qty: 60 TABLET | Refills: 0 | Status: CANCELLED | OUTPATIENT
Start: 2019-01-01

## 2019-01-01 RX ORDER — SODIUM CHLORIDE, SODIUM LACTATE, POTASSIUM CHLORIDE, CALCIUM CHLORIDE 600; 310; 30; 20 MG/100ML; MG/100ML; MG/100ML; MG/100ML
150 INJECTION, SOLUTION INTRAVENOUS CONTINUOUS
Status: DISCONTINUED | OUTPATIENT
Start: 2019-01-01 | End: 2019-01-01

## 2019-01-01 RX ORDER — CALCIUM CHLORIDE 100 MG/ML
INJECTION INTRAVENOUS; INTRAVENTRICULAR
Status: COMPLETED
Start: 2019-01-01 | End: 2019-01-01

## 2019-01-01 RX ORDER — FUROSEMIDE 40 MG/1
40 TABLET ORAL DAILY
Qty: 30 TABLET | Refills: 5 | Status: SHIPPED | OUTPATIENT
Start: 2019-01-01 | End: 2019-01-01 | Stop reason: SDUPTHER

## 2019-01-01 RX ORDER — FOSINOPRIL SODIUM 40 MG/1
40 TABLET ORAL DAILY
Qty: 30 TABLET | Refills: 5 | Status: SHIPPED | OUTPATIENT
Start: 2019-01-01 | End: 2019-01-01 | Stop reason: HOSPADM

## 2019-01-01 RX ORDER — ALBUTEROL SULFATE 2.5 MG/3ML
10 SOLUTION RESPIRATORY (INHALATION) ONCE
Status: COMPLETED | OUTPATIENT
Start: 2019-01-01 | End: 2019-01-01

## 2019-01-01 RX ORDER — PROPOFOL 10 MG/ML
INJECTION, EMULSION INTRAVENOUS AS NEEDED
Status: DISCONTINUED | OUTPATIENT
Start: 2019-01-01 | End: 2019-01-01 | Stop reason: SURG

## 2019-01-01 RX ORDER — ATROPINE SULFATE 0.1 MG/ML
1 SYRINGE (ML) INJECTION ONCE
Status: COMPLETED | OUTPATIENT
Start: 2019-01-01 | End: 2019-01-01

## 2019-01-01 RX ORDER — CLINDAMYCIN PHOSPHATE 900 MG/50ML
900 INJECTION INTRAVENOUS ONCE
Status: COMPLETED | OUTPATIENT
Start: 2019-01-01 | End: 2019-01-01

## 2019-01-01 RX ORDER — SODIUM CHLORIDE 9 MG/ML
INJECTION, SOLUTION INTRAVENOUS CONTINUOUS PRN
Status: DISCONTINUED | OUTPATIENT
Start: 2019-01-01 | End: 2019-01-01 | Stop reason: SURG

## 2019-01-01 RX ORDER — FENTANYL CITRATE-0.9 % NACL/PF 10 MCG/ML
PLASTIC BAG, INJECTION (ML) INTRAVENOUS
Status: COMPLETED
Start: 2019-01-01 | End: 2019-01-01

## 2019-01-01 RX ORDER — CALCIUM CHLORIDE 100 MG/ML
INJECTION INTRAVENOUS; INTRAVENTRICULAR AS NEEDED
Status: DISCONTINUED | OUTPATIENT
Start: 2019-01-01 | End: 2019-01-01 | Stop reason: SURG

## 2019-01-01 RX ORDER — SODIUM CHLORIDE, SODIUM LACTATE, POTASSIUM CHLORIDE, CALCIUM CHLORIDE 600; 310; 30; 20 MG/100ML; MG/100ML; MG/100ML; MG/100ML
1000 INJECTION, SOLUTION INTRAVENOUS ONCE
Status: COMPLETED | OUTPATIENT
Start: 2019-01-01 | End: 2019-01-01

## 2019-01-01 RX ORDER — SODIUM CHLORIDE 9 MG/ML
3 INJECTION INTRAVENOUS AS NEEDED
Status: DISCONTINUED | OUTPATIENT
Start: 2019-01-01 | End: 2019-01-01

## 2019-01-01 RX ORDER — EPINEPHRINE 1 MG/ML
INJECTION, SOLUTION, CONCENTRATE INTRAVENOUS AS NEEDED
Status: DISCONTINUED | OUTPATIENT
Start: 2019-01-01 | End: 2019-01-01 | Stop reason: SURG

## 2019-01-01 RX ORDER — SODIUM CHLORIDE, SODIUM GLUCONATE, SODIUM ACETATE, POTASSIUM CHLORIDE, MAGNESIUM CHLORIDE, SODIUM PHOSPHATE, DIBASIC, AND POTASSIUM PHOSPHATE .53; .5; .37; .037; .03; .012; .00082 G/100ML; G/100ML; G/100ML; G/100ML; G/100ML; G/100ML; G/100ML
200 INJECTION, SOLUTION INTRAVENOUS CONTINUOUS
Status: DISCONTINUED | OUTPATIENT
Start: 2019-01-01 | End: 2019-01-01

## 2019-01-01 RX ORDER — SODIUM CHLORIDE, SODIUM GLUCONATE, SODIUM ACETATE, POTASSIUM CHLORIDE, MAGNESIUM CHLORIDE, SODIUM PHOSPHATE, DIBASIC, AND POTASSIUM PHOSPHATE .53; .5; .37; .037; .03; .012; .00082 G/100ML; G/100ML; G/100ML; G/100ML; G/100ML; G/100ML; G/100ML
1000 INJECTION, SOLUTION INTRAVENOUS ONCE
Status: COMPLETED | OUTPATIENT
Start: 2019-01-01 | End: 2019-01-01

## 2019-01-01 RX ORDER — ATROPINE SULFATE 1 MG/ML
2 INJECTION, SOLUTION INTRAMUSCULAR; INTRAVENOUS; SUBCUTANEOUS ONCE
Status: COMPLETED | OUTPATIENT
Start: 2019-01-01 | End: 2019-01-01

## 2019-01-01 RX ORDER — NOREPINEPHRINE BITARTRATE 1 MG/ML
INJECTION, SOLUTION INTRAVENOUS
Status: COMPLETED
Start: 2019-01-01 | End: 2019-01-01

## 2019-01-01 RX ORDER — FENTANYL CITRATE-0.9 % NACL/PF 10 MCG/ML
75 PLASTIC BAG, INJECTION (ML) INTRAVENOUS CONTINUOUS
Status: DISCONTINUED | OUTPATIENT
Start: 2019-01-01 | End: 2019-01-01

## 2019-01-01 RX ORDER — CLINDAMYCIN PHOSPHATE 600 MG/50ML
600 INJECTION INTRAVENOUS EVERY 8 HOURS
Status: DISCONTINUED | OUTPATIENT
Start: 2019-01-01 | End: 2019-01-01

## 2019-01-01 RX ORDER — PRAVASTATIN SODIUM 20 MG
20 TABLET ORAL DAILY
Qty: 30 TABLET | Refills: 5 | Status: SHIPPED | OUTPATIENT
Start: 2019-01-01 | End: 2019-01-01 | Stop reason: HOSPADM

## 2019-01-01 RX ORDER — ALPRAZOLAM 0.25 MG/1
0.25 TABLET ORAL 2 TIMES DAILY PRN
Qty: 60 TABLET | Refills: 0 | OUTPATIENT
Start: 2019-01-01

## 2019-01-01 RX ORDER — ALBUMIN, HUMAN INJ 5% 5 %
SOLUTION INTRAVENOUS CONTINUOUS PRN
Status: DISCONTINUED | OUTPATIENT
Start: 2019-01-01 | End: 2019-01-01 | Stop reason: SURG

## 2019-01-01 RX ORDER — SODIUM CHLORIDE FOR INHALATION 0.9 %
VIAL, NEBULIZER (ML) INHALATION
Status: DISCONTINUED
Start: 2019-01-01 | End: 2019-01-01 | Stop reason: WASHOUT

## 2019-01-01 RX ADMIN — Medication 1 MCG/MIN: at 18:10

## 2019-01-01 RX ADMIN — SODIUM CHLORIDE, SODIUM GLUCONATE, SODIUM ACETATE, POTASSIUM CHLORIDE, MAGNESIUM CHLORIDE, SODIUM PHOSPHATE, DIBASIC, AND POTASSIUM PHOSPHATE 200 ML/HR: .53; .5; .37; .037; .03; .012; .00082 INJECTION, SOLUTION INTRAVENOUS at 03:29

## 2019-01-01 RX ADMIN — Medication 50 MEQ: at 20:58

## 2019-01-01 RX ADMIN — PROPOFOL 10 MCG/KG/MIN: 10 INJECTION, EMULSION INTRAVENOUS at 19:22

## 2019-01-01 RX ADMIN — METRONIDAZOLE 500 MG: 500 INJECTION, SOLUTION INTRAVENOUS at 19:53

## 2019-01-01 RX ADMIN — NOREPINEPHRINE BITARTRATE 26 MCG/MIN: 1 INJECTION INTRAVENOUS at 01:08

## 2019-01-01 RX ADMIN — CLINDAMYCIN PHOSPHATE 600 MG: 600 INJECTION, SOLUTION INTRAVENOUS at 02:07

## 2019-01-01 RX ADMIN — EPINEPHRINE 10 MCG/MIN: 1 INJECTION, SOLUTION, CONCENTRATE INTRAVENOUS at 22:59

## 2019-01-01 RX ADMIN — SODIUM BICARBONATE 50 MEQ: 84 INJECTION, SOLUTION INTRAVENOUS at 18:28

## 2019-01-01 RX ADMIN — SODIUM CHLORIDE, SODIUM LACTATE, POTASSIUM CHLORIDE, AND CALCIUM CHLORIDE 1000 ML: .6; .31; .03; .02 INJECTION, SOLUTION INTRAVENOUS at 20:36

## 2019-01-01 RX ADMIN — EPINEPHRINE 10 MCG/MIN: 1 INJECTION, SOLUTION, CONCENTRATE INTRAVENOUS at 03:31

## 2019-01-01 RX ADMIN — SODIUM CHLORIDE 1572 ML: 0.9 INJECTION, SOLUTION INTRAVENOUS at 15:41

## 2019-01-01 RX ADMIN — EPINEPHRINE 1 MG: 1 INJECTION, SOLUTION INTRAMUSCULAR; SUBCUTANEOUS at 18:07

## 2019-01-01 RX ADMIN — SODIUM CHLORIDE, SODIUM GLUCONATE, SODIUM ACETATE, POTASSIUM CHLORIDE, MAGNESIUM CHLORIDE, SODIUM PHOSPHATE, DIBASIC, AND POTASSIUM PHOSPHATE 150 ML/HR: .53; .5; .37; .037; .03; .012; .00082 INJECTION, SOLUTION INTRAVENOUS at 20:45

## 2019-01-01 RX ADMIN — SODIUM CHLORIDE, SODIUM LACTATE, POTASSIUM CHLORIDE, AND CALCIUM CHLORIDE 1000 ML/HR: .6; .31; .03; .02 INJECTION, SOLUTION INTRAVENOUS at 19:23

## 2019-01-01 RX ADMIN — ALBUTEROL SULFATE 10 MG: 2.5 SOLUTION RESPIRATORY (INHALATION) at 03:20

## 2019-01-01 RX ADMIN — HEPARIN SODIUM 5000 UNITS: 5000 INJECTION INTRAVENOUS; SUBCUTANEOUS at 21:07

## 2019-01-01 RX ADMIN — ALBUMIN (HUMAN): 12.5 SOLUTION INTRAVENOUS at 18:38

## 2019-01-01 RX ADMIN — SODIUM CHLORIDE: 0.9 INJECTION, SOLUTION INTRAVENOUS at 17:54

## 2019-01-01 RX ADMIN — NOREPINEPHRINE BITARTRATE 30 MCG/MIN: 1 INJECTION INTRAVENOUS at 03:43

## 2019-01-01 RX ADMIN — NOREPINEPHRINE BITARTRATE 30 MCG/MIN: 1 INJECTION INTRAVENOUS at 22:58

## 2019-01-01 RX ADMIN — SODIUM CHLORIDE, SODIUM LACTATE, POTASSIUM CHLORIDE, AND CALCIUM CHLORIDE 150 ML/HR: .6; .31; .03; .02 INJECTION, SOLUTION INTRAVENOUS at 19:24

## 2019-01-01 RX ADMIN — CALCIUM CHLORIDE 1 G: 100 INJECTION INTRAVENOUS; INTRAVENTRICULAR at 04:24

## 2019-01-01 RX ADMIN — Medication 75 MCG/HR: at 19:23

## 2019-01-01 RX ADMIN — Medication 100 MG: at 17:59

## 2019-01-01 RX ADMIN — Medication 50 MEQ: at 01:13

## 2019-01-01 RX ADMIN — PROPOFOL 50 MG: 10 INJECTION, EMULSION INTRAVENOUS at 17:59

## 2019-01-01 RX ADMIN — SODIUM BICARBONATE 50 MEQ: 84 INJECTION, SOLUTION INTRAVENOUS at 18:30

## 2019-01-01 RX ADMIN — CALCIUM CHLORIDE 1 G: 100 INJECTION, SOLUTION INTRAVENOUS; INTRAVENTRICULAR at 18:34

## 2019-01-01 RX ADMIN — VASOPRESSIN 0.04 UNITS/MIN: 20 INJECTION INTRAVENOUS at 18:58

## 2019-01-01 RX ADMIN — EPINEPHRINE 1 MG: 1 INJECTION, SOLUTION INTRAMUSCULAR; SUBCUTANEOUS at 18:05

## 2019-01-01 RX ADMIN — Medication 50 MEQ: at 04:26

## 2019-01-01 RX ADMIN — SODIUM CHLORIDE, SODIUM GLUCONATE, SODIUM ACETATE, POTASSIUM CHLORIDE, MAGNESIUM CHLORIDE, SODIUM PHOSPHATE, DIBASIC, AND POTASSIUM PHOSPHATE 1000 ML: .53; .5; .37; .037; .03; .012; .00082 INJECTION, SOLUTION INTRAVENOUS at 00:48

## 2019-01-01 RX ADMIN — CLINDAMYCIN PHOSPHATE 900 MG: 900 INJECTION, SOLUTION INTRAVENOUS at 18:35

## 2019-01-01 RX ADMIN — SODIUM BICARBONATE 50 MEQ: 84 INJECTION, SOLUTION INTRAVENOUS at 18:07

## 2019-01-01 RX ADMIN — FENTANYL CITRATE 75 MCG/HR: 50 INJECTION INTRAVENOUS at 19:23

## 2019-01-01 RX ADMIN — DEXTROSE MONOHYDRATE 25 ML: 25 INJECTION, SOLUTION INTRAVENOUS at 04:52

## 2019-01-01 RX ADMIN — SODIUM BICARBONATE 50 MEQ: 84 INJECTION, SOLUTION INTRAVENOUS at 18:32

## 2019-01-01 RX ADMIN — NOREPINEPHRINE BITARTRATE 4000 MCG: 1 INJECTION INTRAVENOUS at 20:55

## 2019-01-01 RX ADMIN — METRONIDAZOLE 500 MG: 500 INJECTION, SOLUTION INTRAVENOUS at 03:20

## 2019-01-01 RX ADMIN — SODIUM BICARBONATE 50 MEQ: 84 INJECTION, SOLUTION INTRAVENOUS at 18:11

## 2019-01-01 RX ADMIN — NOREPINEPHRINE BITARTRATE 2 MCG/MIN: 1 INJECTION INTRAVENOUS at 16:58

## 2019-01-01 RX ADMIN — EPINEPHRINE 1 MG: 1 INJECTION, SOLUTION INTRAMUSCULAR; SUBCUTANEOUS at 18:45

## 2019-01-01 RX ADMIN — CALCIUM CHLORIDE 1 G: 100 INJECTION, SOLUTION INTRAVENOUS at 04:24

## 2019-01-01 RX ADMIN — CALCIUM CHLORIDE 1 G: 100 INJECTION, SOLUTION INTRAVENOUS; INTRAVENTRICULAR at 03:25

## 2019-01-01 RX ADMIN — CALCIUM CHLORIDE 1 G: 100 INJECTION, SOLUTION INTRAVENOUS; INTRAVENTRICULAR at 18:07

## 2019-01-01 RX ADMIN — SODIUM BICARBONATE 50 MEQ: 84 INJECTION, SOLUTION INTRAVENOUS at 18:09

## 2019-01-01 RX ADMIN — ATROPINE SULFATE 2 MG: 1 INJECTION, SOLUTION INTRAMUSCULAR; INTRAVENOUS; SUBCUTANEOUS at 04:26

## 2019-01-01 RX ADMIN — VASOPRESSIN 0.04 UNITS/MIN: 20 INJECTION INTRAVENOUS at 23:55

## 2019-01-01 RX ADMIN — CEFEPIME HYDROCHLORIDE 2000 MG: 2 INJECTION, POWDER, FOR SOLUTION INTRAVENOUS at 15:29

## 2019-01-01 RX ADMIN — VANCOMYCIN HYDROCHLORIDE 1250 MG: 10 INJECTION, POWDER, LYOPHILIZED, FOR SOLUTION INTRAVENOUS at 16:36

## 2019-01-01 RX ADMIN — SODIUM CHLORIDE, SODIUM GLUCONATE, SODIUM ACETATE, POTASSIUM CHLORIDE, MAGNESIUM CHLORIDE, SODIUM PHOSPHATE, DIBASIC, AND POTASSIUM PHOSPHATE 1000 ML: .53; .5; .37; .037; .03; .012; .00082 INJECTION, SOLUTION INTRAVENOUS at 22:30

## 2019-07-26 NOTE — TELEPHONE ENCOUNTER
Patient called and she cant come in today for a sick visit, she has a cough, stuffy nose  She is wondering if she can take Advil cold and sinus? If not, can she get something called in for her?   She can be reached on this number [phone]  Please advise  Thank you

## 2019-09-19 NOTE — TELEPHONE ENCOUNTER
Patient called and stated she called in for her Rx but only 2 were sent patient wanted to know why her ALPRAZolam  Was not sent over  She wanted to know If this can be sent to the pharmacy   Please advise    Ph[facility] is [facility]

## 2019-09-30 NOTE — TELEPHONE ENCOUNTER
Servando Sanon has an AWV on 10/23/19 & she hopes to keep her appt  She is afraid she may not make it due to the difficulty she has doing steps  If she can't keep her appt, can Pamela Bhandari set it up so that a Visiting Nurse can give her her Flu Shot? Thank you!

## 2019-10-02 NOTE — TELEPHONE ENCOUNTER
Spoke with pt, she will give us a call back after she calls her son , ,to see if he can be there for the home visit with her

## 2019-10-02 NOTE — TELEPHONE ENCOUNTER
So sorry  vna does not just go out and give flu shots  We can do a home visit on 10/18 if she would like, I can add her to the list    donnap

## 2019-10-21 NOTE — TELEPHONE ENCOUNTER
Pt called and said when the  comes she will hand in the orders from [facility]  Pt wants a call from Pamela Bhandari she is confused  Im also not understaning      Thanks

## 2019-10-22 NOTE — TELEPHONE ENCOUNTER
Left msg for Dr Rajeev Berman office to call with which labs there office ordered for pt so as we do not have duplicate blood work orders

## 2019-11-04 NOTE — TELEPHONE ENCOUNTER
PT son called and said Dolores Saeed gave him the results for urine culture and has an infection that has to be treated by our office with antibiotics  Adelso Covington can  Call Dolores Saeed for results son said  HE asked if Adelso Covington can go in and check and verify those  Please call qi Fraser

## 2019-11-05 NOTE — PROGRESS NOTES
Assessment and Plan:     Problem List Items Addressed This Visit        Endocrine    Type 2 diabetes mellitus with diabetic neuropathy ([facility] ) - Primary     Cont current meds    Lab Results   Component Value Date    HGBA1C 5 5 06/04/2019            Relevant Orders    HEMOGLOBIN A1C W/ EAG ESTIMATION    Microalbumin / creatinine urine ratio       Respiratory    Obstructive sleep apnea     Discussed wearing cpap at hs           Relevant Orders    HEMOGLOBIN A1C W/ EAG ESTIMATION    Microalbumin / creatinine urine ratio       Cardiovascular and Mediastinum    Benign essential hypertension     Stable  Cont current meds           Relevant Medications    furosemide (LASIX) 40 mg tablet    Other Relevant Orders    HEMOGLOBIN A1C W/ EAG ESTIMATION    Microalbumin / creatinine urine ratio       Musculoskeletal and Integument    Rheumatoid arthritis (HCC)     Cont meds and f/u with rheumatology         Relevant Orders    HEMOGLOBIN A1C W/ EAG ESTIMATION    Microalbumin / creatinine urine ratio       Other    Anxiety     Cont meds prn           Relevant Orders    HEMOGLOBIN A1C W/ EAG ESTIMATION    Microalbumin / creatinine urine ratio      Other Visit Diagnoses     Encounter for immunization        Relevant Orders    influenza vaccine, high-dose, PF 0 5 mL (Completed)    HEMOGLOBIN A1C W/ EAG ESTIMATION    Microalbumin / creatinine urine ratio    Localized edema        Relevant Medications    furosemide (LASIX) 40 mg tablet    Other Relevant Orders    HEMOGLOBIN A1C W/ EAG ESTIMATION    Microalbumin / creatinine urine ratio    Scalp psoriasis        Relevant Medications    clobetasol (TEMOVATE) 0 05 % external solution    Other Relevant Orders    HEMOGLOBIN A1C W/ EAG ESTIMATION    Microalbumin / creatinine urine ratio    Medicare annual wellness visit, subsequent               Preventive health issues were discussed with patient, and age appropriate screening tests were ordered as noted in patient's After Visit Summary  Personalized health advice and appropriate referrals for health education or preventive services given if needed, as noted in patient's After Visit Summary  History of Present Illness:     Patient presents for Medicare Annual Wellness visit    Patient Care Team:  Kathrine Grace as PCP - General (Family Medicine)     Problem List:     Patient Active Problem List   Diagnosis    Anxiety    Benign essential hypertension    Dyslipidemia    Glaucoma    Obstructive sleep apnea    Microalbuminuria    Osteoarthrosis    [facility]    Type [facility] diabetes mellitus with diabetic neuropathy [facility]    Vitamin D deficiency      Past Medical and Surgical History:     Past Medical History:   Diagnosis Date    Benign essential hypertension     Diabetes mellitus [facility]     Pulmonary embolism ([facility] )     last assessed: 4/16/2013    Type 2 diabetes mellitus ([facility] )     description: stable off meds Last assessed: 3/19/2015     No past surgical history on file     Family History:     Family History   Problem Relation Age of Onset   Bill Hernandez Stroke Mother     Colon cancer Father       Social History:     Social History     Socioeconomic History    Marital status:      Spouse name: Not on file    Number of children: Not on file    Years of education: Not on file    Highest education level: Not on file   Occupational History    Not on file   Social Needs    Financial resource strain: Not on file    Food insecurity:     Worry: Not on file     Inability: Not on file    Transportation needs:     Medical: Not on file     Non-medical: Not on file   Tobacco Use    Smoking status: Never Smoker    Smokeless tobacco: Never Used   Substance and Sexual Activity    Alcohol use: No    Drug use: Not on file    Sexual activity: Not on file   Lifestyle    Physical activity:     Days per week: Not on file     Minutes per session: Not on file    Stress: Not on file   Relationships    Social connections: Talks on phone: Not on file     Gets together: Not on file     Attends [employer] service: Not on file     Active member of club or organization: Not on file     Attends meetings of clubs or organizations: Not on file     Relationship status: Not on file    Intimate partner violence:     Fear of current or ex partner: Not on file     Emotionally abused: Not on file     Physically abused: Not on file     Forced sexual activity: Not on file   Other Topics Concern    Not on file   Social History Narrative    Not on file       Medications and Allergies:     Current Outpatient Medications   Medication Sig Dispense Refill    ALPRAZolam (XANAX) 0 25 mg tablet Take 1 tablet (0 25 mg total) by mouth 2 (two) times a day as needed for anxiety 60 tablet 0    aspirin 81 MG tablet Take 1 tablet by mouth daily      ciprofloxacin (CIPRO) 500 mg tablet Take 1 tablet (500 mg total) by mouth every 12 (twelve) hours for 7 days 14 tablet 0    clobetasol (TEMOVATE) 0 05 % external solution Apply topically 2 (two) times a day 50 mL 5    cyanocobalamin (VITAMIN B-12) 1,000 mcg tablet Take by mouth      fosinopril (MONOPRIL) 40 mg tablet Take 1 tablet (40 mg total) by mouth daily 30 tablet 5    furosemide (LASIX) 40 mg tablet Take 1 tablet (40 mg total) by mouth daily 30 tablet 5    latanoprost (XALATAN) 0 005 % ophthalmic solution Apply to eye      leucovorin (WELLCOVORIN) 5 mg tablet Take by mouth      methotrexate 2 5 mg tablet Take 6 tablets by mouth once a week      metoprolol tartrate (LOPRESSOR) 25 mg tablet Take 0 5 tablets (12 5 mg total) by mouth 2 (two) times a day 60 tablet 5    pilocarpine (ISOPTO CARPINE) 1 % ophthalmic solution INSTILL 1 DROP IN THE LEFT EYE TWO TIMES A DAY  3    pravastatin (PRAVACHOL) 20 mg tablet Take 1 tablet (20 mg total) by mouth daily 30 tablet 5    sulfaSALAzine (AZULFIDINE-EN) 500 mg EC tablet Take 2 tablets by mouth 2 (two) times a day       No current facility-administered medications for this visit  No Known Allergies   Immunizations:     Immunization History   Administered Date(s) Administered    INFLUENZA 10/04/2014, 10/08/2015, 10/20/2016, 10/17/2017    Influenza Split High Dose Preservative Free IM 10/04/2014, 10/08/2015, 10/20/2016, 10/17/2017    Influenza TIV (IM) 10/08/2011, 10/20/2012, 10/02/2013    Influenza, high dose seasonal 0 5 mL 10/18/2019    Pneumococcal Conjugate 13-Valent 03/19/2015      Health Maintenance: There are no preventive care reminders to display for this patient  Topic Date Due    Pneumococcal Vaccine: 65+ Years (2 of 2 - PPSV23) 03/19/2016      Medicare Health Risk Assessment:     /82   Pulse 74   Temp 98 9 °F (37 2 °C)   Resp 20   SpO2 96%      Noni is here for her Subsequent Wellness visit  Health Risk Assessment:   Patient rates overall health as good  Patient feels that their physical health rating is same  Eyesight was rated as same  Hearing was rated as same  Patient feels that their emotional and mental health rating is same  Pain experienced in the last 7 days has been some  Patient's pain rating has been 5/10  Patient states that she has experienced no weight loss or gain in last 6 months  Depression Screening:   PHQ-2 Score: 0      Fall Risk Screening: In the past year, patient has experienced: no history of falling in past year      Urinary Incontinence Screening:   Patient has not leaked urine accidently in the last six months  Home Safety:  Patient has trouble with stairs inside or outside of their home  Patient has working smoke alarms and has working carbon monoxide detector  Home safety hazards include: none  Nutrition:   Current diet is Diabetic  Medications:   Patient is currently taking over-the-counter supplements  OTC medications include: see medication list  Patient is able to manage medications       Activities of Daily Living (ADLs)/Instrumental Activities of Daily Living (IADLs):   Walk and transfer into and out of bed and chair?: Yes  Dress and groom yourself?: Yes    Bathe or shower yourself?: Yes    Feed yourself? Yes  Do your laundry/housekeeping?: No  Manage your money, pay your bills and track your expenses?: Yes  Make your own meals?: Yes    Do your own shopping?: No    Previous Hospitalizations:   Any hospitalizations or ED visits within the last 12 months?: No      Advance Care Planning:   Living will: No    Durable POA for healthcare: No    Advanced directive: No    Advanced directive counseling given: No    End of Life Decisions reviewed with patient: No      Cognitive Screening:   Provider or family/friend/caregiver concerned regarding cognition?: No    PREVENTIVE SCREENINGS      Cardiovascular Screening:    General: Screening Not Indicated and History Lipid Disorder      Diabetes Screening:     General: Screening Not Indicated and History Diabetes      Breast Cancer Screening:     General: Screening Not Indicated      Cervical Cancer Screening:    General: Screening Not Indicated      Abdominal Aortic Aneurysm (AAA) Screening:        General: Screening Not Indicated      Lung Cancer Screening:     General: Screening Not Indicated      Hepatitis C Screening:    General: Screening Not Indicated    Other Counseling Topics:   Skin self-exam and calcium and vitamin D intake and regular weightbearing exercise         BIRD Mendez

## 2019-11-05 NOTE — PROGRESS NOTES
Assessment/Plan:           Problem List Items Addressed This Visit        Endocrine    Type 2 diabetes mellitus with diabetic neuropathy ([facility] [address] ) - Primary     Cont current meds    Lab Results   Component Value Date    HGBA1C 5 5 06/04/2019            Relevant Orders    HEMOGLOBIN A1C W/ EAG ESTIMATION    Microalbumin / creatinine urine ratio       Respiratory    Obstructive sleep apnea     Discussed wearing cpap at hs           Relevant Orders    HEMOGLOBIN A1C W/ EAG ESTIMATION    Microalbumin / creatinine urine ratio       Cardiovascular and Mediastinum    Benign essential hypertension     Stable  Cont current meds           Relevant Medications    furosemide (LASIX) 40 mg tablet    Other Relevant Orders    HEMOGLOBIN A1C W/ EAG ESTIMATION    Microalbumin / creatinine urine ratio       Musculoskeletal and Integument    Rheumatoid arthritis (HCC)     Cont meds and f/u with rheumatology         Relevant Orders    HEMOGLOBIN A1C W/ EAG ESTIMATION    Microalbumin / creatinine urine ratio       Other    Anxiety     Cont meds prn           Relevant Orders    HEMOGLOBIN A1C W/ EAG ESTIMATION    Microalbumin / creatinine urine ratio      Other Visit Diagnoses     Encounter for immunization        Relevant Orders    influenza vaccine, high-dose, PF 0 5 mL (Completed)    HEMOGLOBIN A1C W/ EAG ESTIMATION    Microalbumin / creatinine urine ratio    Localized edema        Relevant Medications    furosemide (LASIX) 40 mg tablet    Other Relevant Orders    HEMOGLOBIN A1C W/ EAG ESTIMATION    Microalbumin / creatinine urine ratio    Scalp psoriasis        Relevant Medications    clobetasol (TEMOVATE) 0 05 % external solution    Other Relevant Orders    HEMOGLOBIN A1C W/ EAG ESTIMATION    Microalbumin / creatinine urine ratio            Subjective:      Patient ID: Kavita Garrido is a 80 y o  female      Home visit  Patient is homebound  Unsteady gait, severe rheumatoid arthritis making it difficult to get in and out of house  Weakness    Follow up to RA, HTN, anxiety, and diabetes  Did not get labs done, will need [facility] home draw    Not wearing cpap at hs  Tends to sleep on the sofa downstairs  Using single point cane to ambulate around house    Son helps when he can, she lives alone and he checks on her frequently    She needs shampoo for scalp psoriasis      The following portions of the patient's history were reviewed and updated as appropriate: allergies, current medications, past family history, past medical history, past social history, past surgical history and problem list     Review of Systems   Constitutional: Negative for fatigue and fever  HENT: Negative for congestion, postnasal drip and rhinorrhea  Respiratory: Negative for cough, shortness of breath and wheezing  Cardiovascular: Negative for chest pain and palpitations  Gastrointestinal: Negative for constipation, diarrhea, nausea and vomiting  Genitourinary: Negative for dysuria and frequency  Musculoskeletal: Positive for arthralgias and myalgias  Skin: Negative for rash  Neurological: Negative for dizziness, light-headedness and headaches  Hematological: Negative for adenopathy  Psychiatric/Behavioral: Negative for dysphoric mood and sleep disturbance  The patient is not nervous/anxious  Objective:      /82   Pulse 74   Temp 98 9 °F (37 2 °C)   Resp 20   SpO2 96%   Unable to obtain weight         Physical Exam   Constitutional: She is oriented to person, place, and time  She appears well-developed and well-nourished  HENT:   Head: Normocephalic  Right Ear: External ear normal    Left Ear: External ear normal    Nose: Nose normal    Mouth/Throat: Oropharynx is clear and moist    Eyes: Conjunctivae are normal    Neck: Normal range of motion  Neck supple  No thyromegaly present  Cardiovascular: Normal rate, regular rhythm and normal heart sounds     Pulmonary/Chest: Effort normal and breath sounds normal    Abdominal: Soft  Bowel sounds are normal    Musculoskeletal: Normal range of motion  She exhibits edema  Right lower leg: She exhibits edema  Left lower leg: She exhibits edema  Right foot: There is swelling  Left foot: There is swelling    +2 to +3 pitting edema b/l le     Neurological: She is alert and oriented to person, place, and time  Skin: Skin is warm and dry  Capillary refill takes less than 2 seconds  White plaque base of scalp   Psychiatric: She has a normal mood and affect  Her behavior is normal  Judgment and thought content normal    Nursing note and vitals reviewed  BMI Counseling: There is no height or weight on file to calculate BMI  The BMI is above normal  Nutrition recommendations include reducing portion sizes, decreasing overall calorie intake, 3-5 servings of fruits/vegetables daily, reducing fast food intake, consuming healthier snacks, decreasing soda and/or juice intake, moderation in carbohydrate intake, increasing intake of lean protein, reducing intake of saturated fat and trans fat and reducing intake of cholesterol  Exercise recommendations include strength training exercises  Falls Plan of Care: Balance, strength, and gait training instructions were provided

## 2019-11-05 NOTE — PATIENT INSTRUCTIONS
Medicare Preventive Visit Patient Instructions  Thank you for completing your Welcome to Medicare Visit or Medicare Annual Wellness Visit today  Your next wellness visit will be due in one year (11/5/2020)  The screening/preventive services that you may require over the next 5-10 years are detailed below  Some tests may not apply to you based off risk factors and/or age  Screening tests ordered at today's visit but not completed yet may show as past due  Also, please note that scanned in results may not display below  Preventive Screenings:  Service Recommendations Previous Testing/Comments   Colorectal Cancer Screening  * Colonoscopy    * Fecal Occult Blood Test (FOBT)/Fecal Immunochemical Test (FIT)  * Fecal DNA/Cologuard Test  * Flexible Sigmoidoscopy Age: 54-65 years old   Colonoscopy: every 10 years (may be performed more frequently if at higher risk)  OR  FOBT/FIT: every 1 year  OR  Cologuard: every 3 years  OR  Sigmoidoscopy: every 5 years  Screening may be recommended earlier than age 48 if at higher risk for colorectal cancer  Also, an individualized decision between you and your healthcare provider will decide whether screening between the ages of 74-80 would be appropriate  Colonoscopy: Not on file  FOBT/FIT: Not on file  Cologuard: Not on file  Sigmoidoscopy: Not on file         Breast Cancer Screening Age: 36 years old  Frequency: every 1-2 years  Not required if history of left and right mastectomy Mammogram: Not on file       Cervical Cancer Screening Between the ages of 21-29, pap smear recommended once every 3 years  Between the ages of 33-67, can perform pap smear with HPV co-testing every 5 years     Recommendations may differ for women with a history of total hysterectomy, cervical cancer, or abnormal pap smears in past  Pap Smear: Not on file       Hepatitis C Screening Once for adults born [facility]  More frequently in patients at high risk for Hepatitis C Hep C Antibody: Not on file       Diabetes Screening 1-2 times per year if you're at risk for diabetes or have pre-diabetes Fasting glucose: No results in last 5 years   A1C: 5 5 % of total Hgb       Cholesterol Screening Once every 5 years if you don't have a lipid disorder  May order more often based on risk factors  Lipid panel: 10/28/2019         Other Preventive Screenings Covered by Medicare:  1  Abdominal Aortic Aneurysm (AAA) Screening: covered once if your at risk  You're considered to be at risk if you have a family history of AAA  2  Lung Cancer Screening: covers low dose CT scan once per year if you meet all of the following conditions: (1) Age 50-69; (2) No signs or symptoms of lung cancer; (3) Current smoker or have quit smoking within the last 15 years; (4) You have a tobacco smoking history of at least 30 pack years (packs per day multiplied by number of years you smoked); (5) You get a written order from a healthcare provider  3  Glaucoma Screening: covered annually if you're considered high risk: (1) You have diabetes OR (2) Family history of glaucoma OR (3)  aged 48 and older OR (3)  American aged 72 and older  3  Osteoporosis Screening: covered every 2 years if you meet one of the following conditions: (1) You're estrogen deficient and at risk for osteoporosis based off medical history and other findings; (2) Have a vertebral abnormality; (3) On glucocorticoid therapy for more than 3 months; (4) Have primary hyperparathyroidism; (5) On osteoporosis medications and need to assess response to drug therapy  · Last bone density test (DXA Scan): Not on file  5  HIV Screening: covered annually if you're between the age of 12-76  Also covered annually if you are younger than 13 and older than 72 with risk factors for HIV infection  For pregnant patients, it is covered up to 3 times per pregnancy      Immunizations:  Immunization Recommendations   Influenza Vaccine Annual influenza vaccination during flu season is recommended for all persons aged >= 6 months who do not have contraindications   Pneumococcal Vaccine (Prevnar and Pneumovax)  * Prevnar = PCV13  * Pneumovax = PPSV23   Adults 25-60 years old: 1-3 doses may be recommended based on certain risk factors  Adults 72 years old: Prevnar (PCV13) vaccine recommended followed by Pneumovax (PPSV23) vaccine  If already received PPSV23 since turning 65, then PCV13 recommended at least one year after PPSV23 dose  Hepatitis B Vaccine 3 dose series if at intermediate or high risk (ex: diabetes, end stage renal disease, liver disease)   Tetanus (Td) Vaccine - COST NOT COVERED BY MEDICARE PART B Following completion of primary series, a booster dose should be given every 10 years to maintain immunity against tetanus  Td may also be given as tetanus wound prophylaxis  Tdap Vaccine - COST NOT COVERED BY MEDICARE PART B Recommended at least once for all adults  For pregnant patients, recommended with each pregnancy  Shingles Vaccine (Shingrix) - COST NOT COVERED BY MEDICARE PART B  2 shot series recommended in those aged 48 and above     Health Maintenance Due:  There are no preventive care reminders to display for this patient  Immunizations Due:      Topic Date Due    Pneumococcal Vaccine: 65+ Years (2 of 2 - PPSV23) 03/19/2016     Advance Directives   What are advance directives? Advance directives are legal documents that state your wishes and plans for medical care  These plans are made ahead of time in case you lose your ability to make decisions for yourself  Advance directives can apply to any medical decision, such as the treatments you want, and if you want to donate organs  What are the types of advance directives? There are many types of advance directives, and each state has rules about how to use them  You may choose a combination of any of the following:  · Living will: This is a written record of the treatment you want   You can also choose which treatments you do not want, which to limit, and which to stop at a certain time  This includes surgery, medicine, IV fluid, and tube feedings  · Durable power of  for healthcare [place] [facility]): This is a written record that states who you want to make healthcare choices for you when you are unable to make them for yourself  This person, called a proxy, is usually a family member or a friend  You may choose more than 1 proxy  · Do not resuscitate (DNR) order:  A DNR order is used in case your heart stops beating or you stop breathing  It is a request not to have certain forms of treatment, such as CPR  A DNR order may be included in other types of advance directives  · Medical directive: This covers the care that you want if you are in a coma, near death, or unable to make decisions for yourself  You can list the treatments you want for each condition  Treatment may include pain medicine, surgery, blood transfusions, dialysis, IV or tube feedings, and a ventilator (breathing machine)  · Values history: This document has questions about your views, beliefs, and how you feel and think about life  This information can help others choose the care that you would choose  Why are advance directives important? An advance directive helps you control your care  Although spoken wishes may be used, it is better to have your wishes written down  Spoken wishes can be misunderstood, or not followed  Treatments may be given even if you do not want them  An advance directive may make it easier for your family to make difficult choices about your care  © Copyright [employer] 2018 Information is for End User's use only and may not be sold, redistributed or otherwise used for commercial purposes   All illustrations and images included in CareNotes® are the copyrighted property of [employer]  or [employer]

## 2019-12-12 NOTE — TELEPHONE ENCOUNTER
Patient called and wanted to know if Max Amado had her next Home Visit day as patient said ema saw her in Oct  And that she would be back to see her in dec   Please advise

## 2019-12-12 NOTE — TELEPHONE ENCOUNTER
Spoke with pt to advise we will be out to do home visit in middle of January on a Thursday, will contact with date  She stated her leg has been seeping and pants are wet, advised per Trey Sanchez to go to ER for evaluation  Pt refused stated she will go after the Christmas holiday

## 2019-12-17 PROBLEM — M72.6 NECROTIZING FASCIITIS (HCC): Status: ACTIVE | Noted: 2019-01-01

## 2019-12-17 NOTE — ED NOTES
Dr Raymond Anguiano and dr Carlyn Guzman at the bedside for central line placement       Laura Mondragon RN  12/17/19 [id number]

## 2019-12-17 NOTE — ED PROVIDER NOTES
History  Chief Complaint   Patient presents with    Altered Mental Status     pt's son called ems when they found pt altered, naked, and confused at home  pt bradycardic and hypotensive per ems, atropine given pta  Patient was brought in EMS  She is minimally responsive and unable to provide history  Per her sons, patient lives on her own  She was last seen normal last night by her sons, they noted that she had slurred speech at the time  Today when they arrived at her home she was confused and minimally responsive, lying on the floor and nude  They are unsure how long the patient was down  Prior to Admission Medications   Prescriptions Last Dose Informant Patient Reported? Taking?    ALPRAZolam (XANAX) 0 25 mg tablet   No No   Sig: Take 1 tablet (0 25 mg total) by mouth 2 (two) times a day as needed for anxiety   aspirin 81 MG tablet   Yes No   Sig: Take 1 tablet by mouth daily   clobetasol (TEMOVATE) 0 05 % external solution   No No   Sig: Apply topically 2 (two) times a day   cyanocobalamin (VITAMIN B-12) 1,000 mcg tablet   Yes No   Sig: Take by mouth   fosinopril (MONOPRIL) 40 mg tablet   No No   Sig: Take 1 tablet (40 mg total) by mouth daily   furosemide (LASIX) 40 mg tablet   No No   Sig: Take 1 tablet (40 mg total) by mouth daily   latanoprost (XALATAN) 0 005 % ophthalmic solution   Yes No   Sig: Apply to eye   leucovorin (WELLCOVORIN) 5 mg tablet   Yes No   Sig: Take by mouth   methotrexate 2 5 mg tablet   Yes No   Sig: Take 6 tablets by mouth once a week   metoprolol tartrate (LOPRESSOR) 25 mg tablet   No No   Sig: Take 0 5 tablets (12 5 mg total) by mouth 2 (two) times a day   pilocarpine (ISOPTO CARPINE) 1 % ophthalmic solution   Yes No   Sig: INSTILL 1 DROP IN THE LEFT EYE TWO TIMES A DAY   pravastatin (PRAVACHOL) 20 mg tablet   No No   Sig: Take 1 tablet (20 mg total) by mouth daily   sulfaSALAzine (AZULFIDINE-EN) 500 mg EC tablet   Yes No   Sig: Take 2 tablets by mouth 2 (two) times a day      Facility-Administered Medications: None       Past Medical History:   Diagnosis Date    Benign essential hypertension     Diabetes mellitus ([facility] )     Pulmonary embolism ([facility] )     last assessed: 4/16/2013    [facility] ([facility] )     description: stable off meds Last assessed: 3/19/2015       History reviewed  No pertinent surgical history  Family History   Problem Relation Age of Onset    Stroke Mother     Colon cancer Father      I have reviewed and agree with the history as documented  Social History     Tobacco Use    Smoking status: Never Smoker    Smokeless tobacco: Never Used   Substance Use Topics    Alcohol use: No    Drug use: Not on file        Review of Systems   Unable to perform ROS: Mental status change       Physical Exam  ED Triage Vitals   Temperature Pulse Respirations Blood Pressure SpO2   12/17/19 1425 12/17/19 1425 12/17/19 1425 12/17/19 1425 12/17/19 1545   (!) 87 8 °F (31 °C) 74 14 108/60 97 %      Temp Source Heart Rate Source Patient Position - Orthostatic VS BP Location FiO2 (%)   12/17/19 1425 12/17/19 1425 12/17/19 1425 12/17/19 1425 --   Rectal Monitor Lying Left arm       Pain Score       12/17/19 1425       No Pain             Orthostatic Vital Signs  Vitals:    12/17/19 1545 12/17/19 1630 12/17/19 1700 12/17/19 1715   BP:  (!) 87/50 (!) 87/51 96/54   Pulse: 60 60 62 62   Patient Position - Orthostatic VS: Lying Lying Lying Lying       Physical Exam   Constitutional: She is oriented to person, place, and time  She appears well-developed and well-nourished  She appears toxic  She appears ill  HENT:   Head: Normocephalic and atraumatic  Eyes: Conjunctivae are normal  Right eye exhibits no discharge  Left eye exhibits no discharge  No scleral icterus  Neck: Normal range of motion  Neck supple  No JVD present  No tracheal deviation present  Cardiovascular: Normal rate, regular rhythm and normal heart sounds   Exam reveals no gallop and no friction rub    No murmur heard  Pulmonary/Chest: Effort normal and breath sounds normal  No stridor  No respiratory distress  She has no wheezes  She has no rales  Abdominal: Soft  Musculoskeletal: She exhibits edema  She exhibits no tenderness or deformity  Neurological: She is alert and oriented to person, place, and time  No sensory deficit  She exhibits normal muscle tone  Skin: Skin is warm  Bruising and rash noted  She is not diaphoretic  There is erythema  Black eschar right posterior thigh  Skin breakdown bilateral posterior thighs with bruising   Decubitus ulceration   Psychiatric: She has a normal mood and affect  Her behavior is normal  Judgment and thought content normal    Nursing note and vitals reviewed        ED Medications  Medications   sodium chloride (PF) 0 9 % injection 3 mL ( Intravenous MAR Unhold 12/17/19 1828)   norepinephrine (LEVOPHED) 4 mg (STANDARD CONCENTRATION) IV in sodium chloride 0 9% 250 mL (5 mcg/min Intravenous Rate/Dose Change 12/17/19 1829)   lactated ringers infusion ( Intravenous MAR Unhold 12/17/19 1828)   cefepime (MAXIPIME) 2,000 mg in dextrose 5 % 50 mL IVPB (has no administration in time range)   metroNIDAZOLE (FLAGYL) IVPB (premix) 500 mg (has no administration in time range)   clindamycin (CLEOCIN) IVPB (premix) 600 mg (has no administration in time range)   vancomycin (VANCOCIN) 1,750 mg in sodium chloride 0 9 % 500 mL IVPB (has no administration in time range)   heparin (porcine) subcutaneous injection 5,000 Units (has no administration in time range)   atropine (FOR EMS ONLY) 1 mg/10 mL injection 1 mg (0 mg Does not apply Given to EMS 12/17/19 1509)   cefepime (MAXIPIME) 2 g/50 mL dextrose IVPB (0 mg Intravenous Stopped 12/17/19 1636)   vancomycin (VANCOCIN) 1,250 mg in sodium chloride 0 9 % 250 mL IVPB (1,250 mg Intravenous New Bag 12/17/19 1636)   sodium chloride 0 9 % bolus 1,572 mL (0 mL Intravenous Stopped 12/17/19 1637)   clindamycin (CLEOCIN) IVPB (premix) 900 mg (900 mg Intravenous Given 12/17/19 1835)       Diagnostic Studies  Results Reviewed     Procedure Component Value Units Date/Time    T4, free [[id number]]  (Normal) Collected:  12/17/19 1430    Lab Status:  Final result Specimen:  Blood from Arm, Left Updated:  12/17/19 1811     Free T4 0 85 ng/dL     Blood culture #1 [[id number]] Collected:  12/17/19 1430    Lab Status:  Preliminary result Specimen:  Blood from Arm, Left Updated:  12/17/19 1703     Blood Culture Received in Microbiology Lab  Culture in Progress  Blood culture #2 [[id number]] Collected:  12/17/19 1430    Lab Status:  Preliminary result Specimen:  Blood from Arm, Right Updated:  12/17/19 1703     Blood Culture Received in Microbiology Lab  Culture in Progress  Urine Microscopic [[id number]]  (Abnormal) Collected:  12/17/19 1518    Lab Status:  Final result Specimen:  Urine, Indwelling Gauthier Catheter Updated:  12/17/19 1642     RBC, UA None Seen /hpf      WBC, UA 10-20 /hpf      Epithelial Cells Moderate /hpf      Bacteria, UA Moderate /hpf      Hyaline Casts, UA 5-10 /lpf      Ca Oxalate Ina, UA Occasional /hpf      OTHER OBSERVATIONS WBCs Clumped    Urine culture [[id number]] Collected:  12/17/19 1518    Lab Status: In process Specimen:  Urine, Indwelling Gauthier Catheter Updated:  12/17/19 1642    CBC and differential [[id number]]  (Abnormal) Collected:  12/17/19 1430    Lab Status:  Final result Specimen:  Blood from Arm, Left Updated:  12/17/19 1554     WBC 3 09 Thousand/uL      RBC 3 17 Million/uL      Hemoglobin 8 7 g/dL      Hematocrit 29 8 %      MCV 94 fL      MCH 27 4 pg      MCHC 29 2 g/dL      RDW 15 6 %      MPV 10 3 fL      Platelets 586 Thousands/uL      nRBC 4 /100 WBCs     Narrative: This is an appended report  These results have been appended to a previously verified report      UA w Reflex to Microscopic w Reflex to Culture [[id number]]  (Abnormal) Collected:  12/17/19 1518    Lab Status:  Final result Specimen:  Urine, Indwelling Gauthier Catheter Updated:  12/17/19 1538     Color, UA Dk Yellow     Clarity, UA Turbid     Specific Porter, UA 1 014     pH, UA 5 0     Leukocytes, UA Small     Nitrite, UA Negative     Protein, UA Negative mg/dl      Glucose, UA Negative mg/dl      Ketones, UA Negative mg/dl      Urobilinogen, UA 0 2 E U /dl      Bilirubin, UA Negative     Blood, UA Negative    Lactic acid x2 [[id number]]  (Abnormal) Collected:  12/17/19 1430    Lab Status:  Final result Specimen:  Blood from Arm, Left Updated:  12/17/19 1533     LACTIC ACID 8 9 mmol/L     Narrative:       Result may be elevated if tourniquet was used during collection      Comprehensive metabolic panel [[id number]]  (Abnormal) Collected:  12/17/19 1430    Lab Status:  Final result Specimen:  Blood from Arm, Left Updated:  12/17/19 1532     Sodium 142 mmol/L      Potassium 4 2 mmol/L      Chloride 116 mmol/L      CO2 10 mmol/L      ANION GAP 16 mmol/L      BUN 73 mg/dL      Creatinine 2 21 mg/dL      Glucose 98 mg/dL      Calcium 7 7 mg/dL      AST 49 U/L      ALT 27 U/L      Alkaline Phosphatase 155 U/L      Total Protein 5 7 g/dL      Albumin 1 1 g/dL      Total Bilirubin 0 58 mg/dL      eGFR 20 ml/min/1 73sq m     Narrative:       [place] guidelines for Chronic Kidney Disease (CKD):     Stage 1 with normal or high GFR (GFR > 90 mL/min/1 73 square meters)    Stage 2 Mild CKD (GFR = 60-89 mL/min/1 73 square meters)    Stage 3A Moderate CKD (GFR = 45-59 mL/min/1 73 square meters)    Stage 3B Moderate CKD (GFR = 30-44 mL/min/1 73 square meters)    Stage 4 Severe CKD (GFR = 15-29 mL/min/1 73 square meters)    Stage 5 End Stage CKD (GFR <15 mL/min/1 73 square meters)  Note: GFR calculation is accurate only with a steady state creatinine    TSH [[id number]]  (Normal) Collected:  12/17/19 1430    Lab Status:  Final result Specimen:  Blood from Arm, Left Updated:  12/17/19 1532     TSH 3RD GENERATON 2 640 uIU/mL     Narrative: Patients undergoing fluorescein dye angiography may retain small amounts of fluorescein in the body for 48-72 hours post procedure  Samples containing fluorescein can produce falsely depressed TSH values  If the patient had this procedure,a specimen should be resubmitted post fluorescein clearance  CKMB [[id number]]  (Abnormal) Collected:  12/17/19 1430    Lab Status:  Final result Specimen:  Blood from Arm, Left Updated:  12/17/19 1532     CK-MB Index 3 4 %      CK-MB 5 6 ng/mL     Procalcitonin [[id number]]  (Abnormal) Collected:  12/17/19 1430    Lab Status:  Final result Specimen:  Blood from Arm, Left Updated:  12/17/19 1512     Procalcitonin 4 40 ng/ml     CK (with reflex to MB) [[id number]]  (Normal) Collected:  12/17/19 1430    Lab Status:  Final result Specimen:  Blood from Arm, Left Updated:  12/17/19 1506     Total  U/L     Protime-INR [[id number]]  (Abnormal) Collected:  12/17/19 1430    Lab Status:  Final result Specimen:  Blood from Arm, Left Updated:  12/17/19 1505     Protime 18 5 seconds      INR 1 59    APTT [[id number]]  (Normal) Collected:  12/17/19 1430    Lab Status:  Final result Specimen:  Blood from Arm, Left Updated:  12/17/19 1505     PTT 36 seconds     Troponin I [[id number]]  (Normal) Collected:  12/17/19 1430    Lab Status:  Final result Specimen:  Blood from Arm, Left Updated:  12/17/19 1501     Troponin I 0 04 ng/mL     Blood gas, Venous [[id number]] Updated:  12/17/19 1455    Lab Status:  No result Specimen:  Blood from Arm, Left                  CT chest abdomen pelvis wo contrast   Final Result by Darrell Cheney MD (12/17 1652)      1  Mild pulmonary edema with small effusions   2  Mild body wall edema   3  No acute intra-abdominal abnormality   4  Moderate to large stool burden   5  See separate report for description of lower extremities      The study was marked in EPIC for immediate notification           Workstation performed: [id number]         CT femur left wo contrast   Final Result by Darrell Cheney MD (12/17 7703)      1  Soft tissue air noted within the right posterior thigh compartment  Differential includes necrotizing infection /fasciitis; this less likely represents a decubitus ulcer given patient's mobility status post and location within the thigh  2   Asymmetric hypertrophy of the right adductor danae, myositis versus intramuscular collection   3  Nonspecific soft tissue edema throughout both thighs      I personally discussed this study  with Odilon Goodwin on 12/17/2019 at 4:41 PM           Workstation performed: [id number]         CT femur right wo contrast   Final Result by Darrell Cheney MD (12/17 4126)      1  Soft tissue air noted within the right posterior thigh compartment  Differential includes necrotizing infection /fasciitis; this less likely represents a decubitus ulcer given patient's mobility status post and location within the thigh  2   Asymmetric hypertrophy of the right adductor danae, myositis versus intramuscular collection   3  Nonspecific soft tissue edema throughout both thighs      I personally discussed this study  with Odilon Goodwin on 12/17/2019 at 4:41 PM           Workstation performed: [id number]         CT head without contrast   Final Result by Abdi Hidalgo MD (12/17 6208)      No significant interval change  No mass effect, acute intracranial hemorrhage or CT evidence for large acute vascular distribution infarct  Age-related involutional and mild chronic microvascular ischemic change  Workstation performed: [id number]         CT spine cervical without contrast   Final Result by Abdi Hidalgo MD (12/17 6031)      No acute fracture or evidence for traumatic malalignment  Cervical spondylosis, as described above                     Workstation performed: [id number]               Procedures  Central Line  Date/Time: 12/17/2019 6:23 PM  Performed by: Sharda Pendleton DO  Authorized by: Adrian Ferreira DO Fady     Patient location:  ED  Consent:     Consent obtained:  Written    Consent given by:  Healthcare agent    Risks discussed:  Arterial puncture, incorrect placement, bleeding, infection, pneumothorax and nerve damage    Alternatives discussed:  No treatment and delayed treatment  Universal protocol:     Procedure explained and questions answered to patient or proxy's satisfaction: yes      Relevant documents present and verified: yes      Test results available and properly labeled: yes      Radiology Images displayed and confirmed  If images not available, report reviewed: yes      Site/side marked: yes      Immediately prior to procedure, a time out was called: yes      Patient identity confirmed:  Arm band and verbally with patient  Pre-procedure details:     Hand hygiene: Hand hygiene performed prior to insertion      Sterile barrier technique: All elements of maximal sterile technique followed      Skin preparation:  2% chlorhexidine    Skin preparation agent: Skin preparation agent completely dried prior to procedure    Indications:     Central line indications: medications requiring central line    Anesthesia (see MAR for exact dosages): Anesthesia method:  Local infiltration    Local anesthetic:  Lidocaine 1% w/o epi  Procedure details:     Location:  Right internal jugular    Vessel type: vein      Laterality:  Right    Approach: percutaneous technique used      Patient position:  Flat    Catheter type:  Triple lumen 20cm    Catheter size:  7 Fr    Landmarks identified: yes      Ultrasound guidance: yes      Sterile ultrasound techniques: Sterile gel and sterile probe covers were used      Number of attempts:  3    Successful placement: no    Comments:      Procedure was terminated due to excessive patient movement and emergent need for surgical treatment   Patient was immediately sent to OR for           ED Course  ED Course as of Dec 17 1838   Tue Dec 17, 2019   1540 Sepsis alert called Initial Sepsis Screening     Row Name 12/17/19 1535                Is the patient's history suggestive of a new or worsening infection? (!) Yes (Proceed)  -SHANAE        Suspected source of infection  suspect infection, source unknown  -SHANAE        Are two or more of the following signs & symptoms of infection both present and new to the patient? (!) Yes (Proceed)  -SHANAE        Indicate SIRS criteria  Hypothermia < 36C (96 8F); Altered mental status;Leukopenia (WBC < 4000 IJL)  -SHANAE        If the answer is yes to both questions, suspicion of sepsis is present          If severe sepsis is present AND tissue hypoperfusion perists in the hour after fluid resuscitation or lactate > 4, the patient meets criteria for SEPTIC SHOCK          Are any of the following organ dysfunction criteria present within 6 hours of suspected infection and SIRS criteria that are NOT considered to be chronic conditions? (!) Yes  -SHANAE        Organ dysfunction  Creatinine > 2 0 mg/dL;INR > 1 5 or aPTT > 60 secs;Creatinine > 0 5 mg/dl ABOVE BASELINE;Lactate >/equal 4 0 mmol/L  -SHANAE        Date of presentation of severe sepsis  12/17/19  -SHANAE        Time of presentation of severe sepsis  1539  -SHANAE        Tissue hypoperfusion persists in the hour after crystalloid fluid administration, evidenced, by either:  * OR * Lactate level is greater to or equal 4 mmol/dL ( ___ mmol/dL in comment field)  -SHANAE        Was hypotension present within one hour of the conclusion of crystalloid fluid administration?   No  -SHANAE        Date of presentation of septic shock  12/17/19  -SHANAE        Time of presentation of septic shock  [address]  (r) = Recorded By, (t) = Taken By, (c) = Cosigned By    [address] Name Provider Type    Anusha Chavez DO Resident                  [facility]  Number of Diagnoses or Management Options  Necrotizing fasciitis [facility]):   Diagnosis management comments: 80yoF brought in by EMS, found to be AMS and nude on the floor at home  Patient was GCS 13 upon arrival, eyes open, moved extremities to command and verbal with inappropriate words  Airway was intact  Extensive bruising and skin breakdown was seen on sacrum and posterior thighs bilaterally  Pt was hypothermic, given warm IV fluids and christo hugger  Sepsis workup started  Sepsis alert was called and pt given 30cc/kg ideal body weight of fluid, started on empirical antibiotics  CT scan showed air in posterior compartment of right thigh, consistent with necrotizing fasciitis  Surgery was immediately contacted and patient was transported to OR for surgical debridement     Central line placement was attempted but unable to be placed in ED due to patient movement and emergent need for surgical intervention        Disposition  Final diagnoses:   Necrotizing fasciitis ([address] )   Altered mental status   Sepsis ([facility] )     Time reflects when diagnosis was documented in both MDM as applicable and the Disposition within this note     Time User Action Codes Description Comment    12/17/2019  5:13 PM Nnamdi Camacho Add [M72 6] Necrotizing fasciitis ([facility] )     12/17/2019  6:14 PM Salbador Shresthaepcion Isidro Modify [M72 6] Necrotizing fasciitis ([facility] [address] )     12/17/2019  6:38 PM Paster, [address] [place] [address] [R41 [id number]] Altered mental status     12/17/2019  6:38 PM Paster, [address] [place] [address] [A41 9] Sepsis [facility])       ED Disposition     None      Follow-up Information    None         Current Discharge Medication List      CONTINUE these medications which have NOT CHANGED    Details   ALPRAZolam (XANAX) 0 25 mg tablet Take 1 tablet (0 25 mg total) by mouth 2 (two) times a day as needed for anxiety  Qty: 60 tablet, Refills: 0    Associated Diagnoses: Anxiety      aspirin 81 MG tablet Take 1 tablet by mouth daily      clobetasol (TEMOVATE) 0 05 % external solution Apply topically 2 (two) times a day  Qty: 50 mL, Refills: 5    Associated Diagnoses: Scalp psoriasis      cyanocobalamin (VITAMIN B-12) 1,000 mcg tablet Take by mouth fosinopril (MONOPRIL) 40 mg tablet Take 1 tablet (40 mg total) by mouth daily  Qty: 30 tablet, Refills: 5    Associated Diagnoses: Essential hypertension      furosemide (LASIX) 40 mg tablet Take 1 tablet (40 mg total) by mouth daily  Qty: 30 tablet, Refills: 5    Associated Diagnoses: Localized edema      latanoprost (XALATAN) 0 005 % ophthalmic solution Apply to eye      leucovorin (WELLCOVORIN) 5 mg tablet Take by mouth      methotrexate 2 5 mg tablet Take 6 tablets by mouth once a week      metoprolol tartrate (LOPRESSOR) 25 mg tablet Take 0 5 tablets (12 5 mg total) by mouth 2 (two) times a day  Qty: 60 tablet, Refills: 5    Associated Diagnoses: Essential hypertension      pilocarpine (ISOPTO CARPINE) 1 % ophthalmic solution INSTILL 1 DROP IN THE LEFT EYE TWO TIMES A DAY  Refills: 3      pravastatin (PRAVACHOL) 20 mg tablet Take 1 tablet (20 mg total) by mouth daily  Qty: 30 tablet, Refills: 5    Associated Diagnoses: Mixed hyperlipidemia      sulfaSALAzine (AZULFIDINE-EN) 500 mg EC tablet Take 2 tablets by mouth 2 (two) times a day           No discharge procedures on file  ED Provider  Attending physically available and evaluated [address]  I managed the patient along with the ED Attending      Electronically Signed by         Bruno George DO  12/17/19 8749

## 2019-12-17 NOTE — ED ATTENDING ATTESTATION
12/17/2019  IGarth MD, saw and evaluated the patient  I have discussed the patient with the resident/non-physician practitioner and agree with the resident's/non-physician practitioner's findings, Plan of Care, and MDM as documented in the resident's/non-physician practitioner's note, except where noted  All available labs and Radiology studies were reviewed  I was present for key portions of any procedure(s) performed by the resident/non-physician practitioner and I was immediately available to provide assistance  At this point I agree with the current assessment done in the Emergency Department  I have conducted an independent evaluation of this patient a history and physical is as follows:    ED Course     71-year-old female, seen on arrival to the emergency department for evaluation of altered mental status  History per EMS was that they got a call to the residence by the son who found the patient and had last seen the patient normal yesterday  EMS found the patient to be hypothermic, bradycardic, hypotensive, and administered atropine 0 5 mg pre-hospital   Patient was altered today  History that is obtained from the son's was not initially available on initial evaluation or on EMS evaluation, was that the patient has been increasingly less likely to ambulate over the past week  The patient had some slurred speech over the past day  The patient was found naked in her residence with altered mental status by her son's this afternoon  They had last talked to her at approximately 10:00, and at that time she had some slurred speech but seemed to be coherent  No recent cough  No reported vomiting or diarrhea  Patient is too sick to provide any further history  Patient has altered mental status prevent an review of systems  On examination the patient is hypothermic, normotensive, relatively bradycardic  Head is normocephalic and atraumatic    Eyelids lashes are normal   Mucous membranes are dry  Neck is supple without meningismus  Lungs are diminished bilaterally secondary to body habitus  Heart is irregular  No murmurs rubs or gallops are appreciated  Abdomen is obese, soft, nontender  Extremities with bilateral lower extremity edema  On skin exam the patient has pressure lesion on the left posterior thigh  The patient has grade 1-2 pre sacral decubitus ulcer  The patient is noted to have a full-thickness ulceration of the left heel  The patient has bulla on the right heel  Altered mental status of unknown etiology  Differential includes central neurologic, sepsis, cardiac, intra-abdominal     Labs Reviewed   LACTIC ACID, PLASMA - Abnormal       Result Value Ref Range Status    LACTIC ACID 8 9 (*) 0 5 - 2 0 mmol/L Final    Narrative:     Result may be elevated if tourniquet was used during collection  PROCALCITONIN TEST - Abnormal    Procalcitonin 4 40 (*) <=0 25 ng/ml Final    Comment: Suspected Lower Respiratory Tract Infection (LRTI):  - LESS than or EQUAL to 0 25 ng/mL:   low likelihood for bacterial LRTI; antibiotics DISCOURAGED   - GREATER than 0 25 ng/mL:   increased likelihood for bacterial LRTI; antibiotics ENCOURAGED  Suspected Sepsis:  - Strongly consider initiating antibiotics in ALL UNSTABLE patients  - LESS than or EQUAL to 0 5 ng/mL:   low likelihood for bacterial sepsis; antibiotics DISCOURAGED   - GREATER than 0 5 ng/mL:   increased likelihood for bacterial sepsis; antibiotics ENCOURAGED   - GREATER than 2 ng/mL:   high risk for severe sepsis / septic shock; antibiotics strongly ENCOURAGED  Decisions on antibiotic use should not be based solely on Procalcitonin (PCT) levels  If PCT is low but uncertainty exists with stopping antibiotics, repeat PCT in 6-24 hours to confirm the low level   If antibiotics are administered (regardless if initial PCT was high or low), repeat PCT every 1-2 days to consider early antibiotic cessation (when GREATER than 80% decrease from the peak OR when PCT drops below designated cutoffs, whichever comes first), so long as the infection is NOT one that typically requires prolonged treatment durations (e g , bone/joint infections, endocarditis, Staph  aureus bacteremia)  Situations of FALSE-POSITIVE Procalcitonin values:  1) Newborns < 67 hours old  2) Massive stress from severe trauma / burns, major surgery, acute pancreatitis, cardiogenic / hemorrhagic shock, sickle cell crisis, or other organ perfusion abnormalities  3) Malaria and some Candidal infections  4) Treatment with agents that stimulate cytokines (e g , OKT3, anti-lymphocyte globulins, alemtuzumab, IL-2, granulocyte transfusion [NOT GCSFs])  5) Chronic renal disease causes elevated baseline levels (consider GREATER than 0 75 ng/mL as an abnormal cut-off); initiating HD/CRRT may cause transient decreases  6) Paraneoplastic syndromes from medullary thyroid or SCLC, some forms of vasculitis, and acute [id number]-[id number] disease    Situations of FALSE-NEGATIVE Procalcitonin values:  1) Too early in clinical course for PCT to have reached its peak (may repeat in 6-24 hours to confirm low level)  2) Localized infection WITHOUT systemic (SIRS / sepsis) response (e g , an abscess, osteomyelitis, cystitis)  3) Mycobacteria (e g , Tuberculosis, MAC)  4) Cystic fibrosis exacerbations     CBC AND DIFFERENTIAL - Abnormal    WBC 3 09 (*) 4 31 - 10 16 Thousand/uL Final    RBC 3 17 (*) 3 81 - 5 12 Million/uL Final    Hemoglobin 8 7 (*) 11 5 - 15 4 g/dL Final    Hematocrit 29 8 (*) 34 8 - 46 1 % Final    MCV 94  82 - 98 fL Final    MCH 27 4  26 8 - 34 3 pg Final    MCHC 29 2 (*) 31 4 - 37 4 g/dL Final    RDW 15 6 (*) 11 6 - 15 1 % Final    MPV 10 3  8 9 - 12 7 fL Final    Platelets 379  288 - 390 Thousands/uL Final    nRBC 4  /100 WBCs Final    Comment: This is an appended report  These results have been appended to a previously preliminary verified report  Narrative:      This is an appended report  These results have been appended to a previously verified report  PROTIME-INR - Abnormal    Protime 18 5 (*) 11 6 - 14 5 seconds Final    INR 1 59 (*) 0 84 - 1 19 Final   COMPREHENSIVE METABOLIC PANEL - Abnormal    Sodium 142  136 - 145 mmol/L Final    Potassium 4 2  3 5 - 5 3 mmol/L Final    Comment: Slightly Hemolyzed; Results May be Affected    Chloride 116 (*) 100 - 108 mmol/L Final    CO2 10 (*) 21 - 32 mmol/L Final    ANION GAP 16 (*) 4 - 13 mmol/L Final    BUN 73 (*) 5 - 25 mg/dL Final    Creatinine 2 21 (*) 0 60 - 1 30 mg/dL Final    Comment: Standardized to IDMS reference method    Glucose 98  65 - 140 mg/dL Final    Comment:   If the patient is fasting, the ADA then defines impaired fasting glucose as > 100 mg/dL and diabetes as > or equal to 123 mg/dL  Specimen collection should occur prior to Sulfasalazine administration due to the potential for falsely depressed results  Specimen collection should occur prior to Sulfapyridine administration due to the potential for falsely elevated results  Calcium 7 7 (*) 8 3 - 10 1 mg/dL Final    AST 49 (*) 5 - 45 U/L Final    Comment: Slightly Hemolyzed; Results May be Affected  Specimen collection should occur prior to Sulfasalazine administration due to the potential for falsely depressed results  ALT 27  12 - 78 U/L Final    Comment:   Specimen collection should occur prior to Sulfasalazine and/or Sulfapyridine administration due to the potential for falsely depressed results       Alkaline Phosphatase 155 (*) 46 - 116 U/L Final    Total Protein 5 7 (*) 6 4 - 8 2 g/dL Final    Albumin 1 1 (*) 3 5 - 5 0 g/dL Final    Total Bilirubin 0 58  0 20 - 1 00 mg/dL Final    eGFR 20  ml/min/1 73sq m Final    Narrative:     [place] guidelines for Chronic Kidney Disease (CKD):     Stage 1 with normal or high GFR (GFR > 90 mL/min/1 73 square meters)    Stage 2 Mild CKD (GFR = 60-89 mL/min/1 73 square meters)    Stage 3A Moderate CKD (GFR = 45-59 mL/min/1 73 square meters)    Stage 3B Moderate CKD (GFR = 30-44 mL/min/1 73 square meters)    Stage 4 Severe CKD (GFR = 15-29 mL/min/1 73 square meters)    Stage 5 End Stage CKD (GFR <15 mL/min/1 73 square meters)  Note: GFR calculation is accurate only with a steady state creatinine   UA W REFLEX TO MICROSCOPIC WITH REFLEX TO CULTURE - Abnormal    Color, UA Dk Yellow   Final    Clarity, UA Turbid   Final    Specific [place], UA 1 014  1 003 - 1 030 Final    pH, UA 5 0  4 5, 5 0, 5 5, 6 0, 6 5, 7 0, 7 5, 8 0 Final    Leukocytes, UA Small (*) Negative Final    Nitrite, UA Negative  Negative Final    Protein, UA Negative  Negative mg/dl Final    Glucose, UA Negative  Negative mg/dl Final    Ketones, UA Negative  Negative mg/dl Final    Urobilinogen, UA 0 2  0 2, 1 0 E U /dl E U /dl Final    Bilirubin, UA Negative  Negative Final    Blood, UA Negative  Negative Final   CKMB - Abnormal    CK-MB Index 3 4 (*) 0 0 - 2 5 % Final    CK-MB 5 6 (*) 0 0 - 5 0 ng/mL Final   URINE MICROSCOPIC - Abnormal    RBC, UA None Seen  None Seen, 0-5 /hpf Final    WBC, UA 10-20 (*) None Seen, 0-5, 5-55, 5-65 /hpf Final    Epithelial Cells Moderate (*) None Seen, Occasional /hpf Final    Bacteria, UA Moderate (*) None Seen, Occasional /hpf Final    Hyaline Casts, UA 5-10 (*) None Seen /lpf Final    Ca Oxalate Ina, UA Occasional (*) None Seen /hpf Final    OTHER OBSERVATIONS WBCs Clumped   Final   MANUAL DIFFERENTIAL(PHLEBS DO NOT ORDER) - Abnormal    Segmented % 67  43 - 75 % Final    Bands % 2  0 - 8 % Final    Lymphocytes % 9 (*) 14 - 44 % Final    Monocytes % 16 (*) 4 - 12 % Final    Eosinophils, % 0  0 - 6 % Final    Basophils % 0  0 - 1 % Final    Metamyelocytes% 1  0 - 1 % Final    Myelocytes % 2 (*) 0 - 1 % Final    Promyelocytes % 3 (*) 0 - 0 % Final    Absolute Neutrophils 2 13  1 85 - 7 62 Thousand/uL Final    Lymphocytes Absolute 0 28 (*) 0 60 - 4 47 Thousand/uL Final    Monocytes Absolute 0 49 0 00 - 1 22 Thousand/uL Final    Eosinophils Absolute 0 00  0 00 - 0 40 Thousand/uL Final    Basophils Absolute 0 00  0 00 - 0 10 Thousand/uL Final    Total Counted 100   Final    Toxic Vacuolated Neutrophils Present   Final    Anisocytosis Present   Final    Estela Cells Present   Final    Polychromasia Present   Final    Platelet Estimate Adequate  Adequate Final   TROPONIN I - Normal    Troponin I 0 04  <=0 04 ng/mL Final    Comment:   [employer] Chemistry analyzer 99% cutoff is > 0 04 ng/mL in network labs     o cTnI 99% cutoff is useful only when applied to patients in the clinical setting of myocardial ischemia   o cTnI 99% cutoff should be interpreted in the context of clinical history, ECG findings and possibly cardiac imaging to establish correct diagnosis  o cTnI 99% cutoff may be suggestive but clearly not indicative of a coronary event without the clinical setting of myocardial ischemia  APTT - Normal    PTT 36  23 - 37 seconds Final    Comment: Therapeutic Heparin Range =  60-90 seconds   TSH, 3RD GENERATION - Normal    TSH 3RD GENERATON 2 640  0 358 - 3 740 uIU/mL Final    Comment:   The recommended reference ranges for TSH during pregnancy are as follows:   First trimester 0 1 to 2 5 uIU/mL   Second trimester  0 2 to 3 0 uIU/mL   Third trimester 0 3 to 3 0 uIU/m    Note: Normal ranges may not apply to patients who are transgender, non-binary, or whose legal sex, sex at birth, and gender identity differ  Using supplements with high doses of biotin 20 to more than 300 times greater than the adequate daily intake for adults of 30 mcg/day as established by the [place] [employer], can cause falsely depress results  Narrative:     Patients undergoing fluorescein dye angiography may retain small amounts of fluorescein in the body for 48-72 hours post procedure  Samples containing fluorescein can produce falsely depressed TSH values   If the patient had this procedure,a specimen should be resubmitted post fluorescein clearance  CK - Normal    Total   26 - 192 U/L Final   BLOOD CULTURE    Blood Culture Received in Microbiology Lab  Culture in Progress  Preliminary   BLOOD CULTURE    Blood Culture Received in Microbiology Lab  Culture in Progress  Preliminary   URINE CULTURE   BLOOD GAS, VENOUS       CT chest abdomen pelvis wo contrast   Final Result      1  Mild pulmonary edema with small effusions   2  Mild body wall edema   3  No acute intra-abdominal abnormality   4  Moderate to large stool burden   5  See separate report for description of lower extremities      The study was marked in EPIC for immediate notification  Workstation performed: [id number]         CT femur left wo contrast   Final Result      1  Soft tissue air noted within the right posterior thigh compartment  Differential includes necrotizing infection /fasciitis; this less likely represents a decubitus ulcer given patient's mobility status post and location within the thigh  2   Asymmetric hypertrophy of the right adductor danae, myositis versus intramuscular collection   3  Nonspecific soft tissue edema throughout both thighs      I personally discussed this study  with Marco Sinclair on 12/17/2019 at 4:41 PM           Workstation performed: [id number]         CT femur right wo contrast   Final Result      1  Soft tissue air noted within the right posterior thigh compartment  Differential includes necrotizing infection /fasciitis; this less likely represents a decubitus ulcer given patient's mobility status post and location within the thigh  2   Asymmetric hypertrophy of the right adductor danae, myositis versus intramuscular collection   3    Nonspecific soft tissue edema throughout both thighs      I personally discussed this study  with Marco Sinclair on 12/17/2019 at 4:41 PM           Workstation performed: [id number]         CT head without contrast   Final Result      No significant interval change  No mass effect, acute intracranial hemorrhage or CT evidence for large acute vascular distribution infarct  Age-related involutional and mild chronic microvascular ischemic change  Workstation performed: [id number]         CT spine cervical without contrast   Final Result      No acute fracture or evidence for traumatic malalignment  Cervical spondylosis, as described above  Workstation performed: [id number]             Severe sepsis was identified when lactate was resulted at 15:33  IV fluids were ordered based on an ideal body weight of 52 4 kg  After CT resulted, surgery saw the patient in the emergency department, and decision was made to take the patient to the OR for necrotizing fasciitis  Clindamycin ordered  Patient became progressively hypotensive requiring vasopressors  Plan was for central line, however the patient became combative with sitting up for the central line so decision was to place a central line in the OR      Critical Care Time  CriticalCare Time  Performed by: Tristan Gaines MD  Authorized by: Tristan Gaines MD     Critical care provider statement:     Critical care time (minutes):  80    Critical care time was exclusive of:  Separately billable procedures and treating other patients and teaching time    Critical care was necessary to treat or prevent imminent or life-threatening deterioration of the following conditions:  Circulatory failure, sepsis, shock, metabolic crisis and CNS failure or compromise    Critical care was time spent personally by me on the following activities:  Blood draw for specimens, obtaining history from patient or surrogate, development of treatment plan with patient or surrogate, discussions with consultants, evaluation of patient's response to treatment, examination of patient, ventilator management, review of old charts, ordering and review of laboratory studies, ordering and review of radiographic studies, re-evaluation of patient's condition and ordering and performing treatments and interventions  Comments:      Patient with severe sepsis secondary to necrotizing fasciitis of the left lower extremity

## 2019-12-17 NOTE — ANESTHESIA PROCEDURE NOTES
Arterial Line Insertion  Performed by: Odilon Curran CRNA  Authorized by: Odilon Curran CRNA   Risks and benefits: risks, benefits and alternatives were discussed  Required items: required blood products, implants, devices, and special equipment available  Patient identity confirmed: arm band  Time out: Immediately prior to procedure a "time out" was called to verify the correct patient, procedure, equipment, support staff and site/side marked as required  Preparation: Patient was prepped and draped in the usual sterile fashion    Indications: multiple ABGs and hemodynamic monitoring  Orientation:  Left  Location: brachial artery  Procedure Details:  Number of attempts: 2    Post-procedure:  Post-procedure: dressing applied  Waveform: good waveform  Post-procedure CNS: normal  Patient tolerance: Patient tolerated the procedure well with no immediate complications

## 2019-12-17 NOTE — ANESTHESIA PROCEDURE NOTES
Central Line Insertion  Performed by: Alexandra Huynh MD  Authorized by: Alexandra Huynh MD   Date/Time: 12/17/2019 6:10 PM  Catheter Type:  triple lumen  Risks and benefits: risks, benefits and alternatives were discussed  Required items: required blood products, implants, devices, and special equipment available  Patient identity confirmed: arm band  Time out: Immediately prior to procedure a "time out" was called to verify the correct patient, procedure, equipment, support staff and site/side marked as required  Indications: vascular access  Location details: left internal jugular  Catheter size: 7 Fr  Patient position: Trendelenburg  Assessment: blood return through all ports and free fluid flow  Preparation: Chloraprep  Ultrasound guidance: no  sterile gel and probe cover used in ultrasound-guided central venous catheter insertionReason All Sterile Barriers Not Used:   All elements of maximal sterile barrier technique not followed for medical reasons  Vessel of Catheter Tip End: SVC  Number of attempts: 1  Successful placement: yes  Post-procedure: chlorhexidine patch applied  Patient tolerance: Patient tolerated the procedure well with no immediate complications

## 2019-12-17 NOTE — H&P
H&P Exam - General Surgery   Cecilia Steele 80 y o  female MRN: [id number]  Unit/Bed#: ED 24 Encounter: [id number]    Assessment/Plan     Assessment:  49-year-old female who was found down by her sons this afternoon concern for necrotizing soft tissue of bilateral extremities  CT scan shows soft tissue air within the right posterior thigh compartment concern for necrotizing soft tissue infection  Plan for OR for emergent debridement  Patient is hypothermic with a temperature of 87 8, hypotensive with blood pressure 87/50  Plan:  NPO  IV fluids  OR for debridement    History of Present Illness     HPI:  Cecilia Steele is a 80 y o  female who presents with necrotizing soft tissue infection of right lower extremity  Due to patient's mental status she is unable to give a history  Patient has signs found her down earlier today  Per chart review patient was altered and non responsiveness was brought in by EMS  Review of Systems   Reason unable to perform ROS: Patient's mental status  Historical Information   Past Medical History:   Diagnosis Date    Benign essential hypertension     Diabetes mellitus ([facility] )     Pulmonary embolism ([facility] )     last assessed: 4/16/2013    Type 2 diabetes mellitus ([facility] )     description: stable off meds Last assessed: 3/19/2015     History reviewed  No pertinent surgical history  Social History   Social History     Substance and Sexual Activity   Alcohol Use No     Social History     Substance and Sexual Activity   Drug Use Not on file     Social History     Tobacco Use   Smoking Status Never Smoker   Smokeless Tobacco Never Used     Family History:   Family History   Problem Relation Age of Onset    Stroke Mother     Colon cancer Father        Meds/Allergies   PTA meds:   Prior to Admission Medications   Prescriptions Last Dose Informant Patient Reported? Taking?    ALPRAZolam (XANAX) 0 25 mg tablet   No No   Sig: Take 1 tablet (0 25 mg total) by mouth 2 (two) times a day as needed for anxiety   aspirin 81 MG tablet   Yes No   Sig: Take 1 tablet by mouth daily   clobetasol (TEMOVATE) 0 05 % external solution   No No   Sig: Apply topically 2 (two) times a day   cyanocobalamin (VITAMIN B-12) 1,000 mcg tablet   Yes No   Sig: Take by mouth   fosinopril (MONOPRIL) 40 mg tablet   No No   Sig: Take 1 tablet (40 mg total) by mouth daily   furosemide (LASIX) 40 mg tablet   No No   Sig: Take 1 tablet (40 mg total) by mouth daily   latanoprost (XALATAN) 0 005 % ophthalmic solution   Yes No   Sig: Apply to eye   leucovorin (WELLCOVORIN) 5 mg tablet   Yes No   Sig: Take by mouth   methotrexate 2 5 mg tablet   Yes No   Sig: Take 6 tablets by mouth once a week   metoprolol tartrate (LOPRESSOR) 25 mg tablet   No No   Sig: Take 0 5 tablets (12 5 mg total) by mouth 2 (two) times a day   pilocarpine (ISOPTO CARPINE) 1 % ophthalmic solution   Yes No   Sig: INSTILL 1 DROP IN THE LEFT EYE TWO TIMES A DAY   pravastatin (PRAVACHOL) 20 mg tablet   No No   Sig: Take 1 tablet (20 mg total) by mouth daily   sulfaSALAzine (AZULFIDINE-EN) 500 mg EC tablet   Yes No   Sig: Take 2 tablets by mouth 2 (two) times a day      Facility-Administered Medications: None     No Known Allergies    Objective   First Vitals:   Blood Pressure: 108/60 (12/17/19 1425)  Pulse: 74 (12/17/19 1425)  Temperature: (!) 87 8 °F (31 °C) (12/17/19 1425)  Temp Source: Rectal (12/17/19 1425)  Respirations: 14 (12/17/19 1425)  Weight - Scale: 120 kg (265 lb 3 4 oz) (12/17/19 1425)  SpO2: 97 % (12/17/19 1545)    Current Vitals:   Blood Pressure: (!) 87/51 (12/17/19 1700)  Pulse: 62 (12/17/19 1700)  Temperature: (!) 87 8 °F (31 °C) (12/17/19 1700)  Temp Source: Tympanic Core (12/17/19 1545)  Respirations: 22 (12/17/19 1700)  Weight - Scale: 120 kg (265 lb 3 4 oz) (12/17/19 1425)  SpO2: 97 % (12/17/19 1700)      Intake/Output Summary (Last 24 hours) at 12/17/2019 1709  Last data filed at 12/17/2019 1543  Gross per 24 hour   Intake    Output 350 ml   Net -350 ml       Invasive Devices     Peripheral Intravenous Line            Peripheral IV 12/17/19 Left Forearm less than 1 day    Peripheral IV 12/17/19 Left Forearm less than 1 day    Peripheral IV 12/17/19 Right Forearm less than 1 day          Drain            Urethral Catheter Temperature probe less than 1 day                Physical Exam   Constitutional: She appears well-developed  Cardiovascular: Normal rate  Pulmonary/Chest: Effort normal    Neurological:   Non-responsive   Skin:   Necrotic tissue of posterior right thigh  Lab Results:   CBC:   Lab Results   Component Value Date    WBC 3 09 (L) 12/17/2019    HGB 8 7 (L) 12/17/2019    HCT 29 8 (L) 12/17/2019    MCV 94 12/17/2019     12/17/2019    MCH 27 4 12/17/2019    MCHC 29 2 (L) 12/17/2019    RDW 15 6 (H) 12/17/2019    MPV 10 3 12/17/2019    NRBC 4 12/17/2019   , CMP:   Lab Results   Component Value Date    SODIUM 142 12/17/2019    K 4 2 12/17/2019     (H) 12/17/2019    CO2 10 (L) 12/17/2019    BUN 73 (H) 12/17/2019    CREATININE 2 21 (H) 12/17/2019    CALCIUM 7 7 (L) 12/17/2019    AST 49 (H) 12/17/2019    ALT 27 12/17/2019    ALKPHOS 155 (H) 12/17/2019    EGFR 20 12/17/2019   , Coagulation:   Lab Results   Component Value Date    INR 1 59 (H) 12/17/2019     Imaging: I have personally reviewed pertinent reports  CT chest abdomen pelvis wo contrast   Final Result by Jaida Farrell MD (12/17 1652)      1  Mild pulmonary edema with small effusions   2  Mild body wall edema   3  No acute intra-abdominal abnormality   4  Moderate to large stool burden   5  See separate report for description of lower extremities      The study was marked in EPIC for immediate notification  Workstation performed: [id number]         CT femur left wo contrast   Final Result by Jaida Farrell MD (12/17 1642)      1  Soft tissue air noted within the right posterior thigh compartment    Differential includes necrotizing infection /fasciitis; this less likely represents a decubitus ulcer given patient's mobility status post and location within the thigh  2   Asymmetric hypertrophy of the right adductor danae, myositis versus intramuscular collection   3  Nonspecific soft tissue edema throughout both thighs      I personally discussed this study  with Rich Jeff on 12/17/2019 at 4:41 PM           Workstation performed: [id number]         CT femur right wo contrast   Final Result by Catarino Gerber MD (12/17 1642)      1  Soft tissue air noted within the right posterior thigh compartment  Differential includes necrotizing infection /fasciitis; this less likely represents a decubitus ulcer given patient's mobility status post and location within the thigh  2   Asymmetric hypertrophy of the right adductor danae, myositis versus intramuscular collection   3  Nonspecific soft tissue edema throughout both thighs      I personally discussed this study  with Rich Aguilar on 12/17/2019 at 4:41 PM           Workstation performed: [id number]         CT head without contrast   Final Result by Soraya Gilman MD (12/17 1621)      No significant interval change  No mass effect, acute intracranial hemorrhage or CT evidence for large acute vascular distribution infarct  Age-related involutional and mild chronic microvascular ischemic change  Workstation performed: [id number]         CT spine cervical without contrast   Final Result by Soraya Gilman MD (12/17 1633)      No acute fracture or evidence for traumatic malalignment  Cervical spondylosis, as described above  Workstation performed: [id number]             EKG, Pathology, and Other Studies: I have personally reviewed pertinent reports  Code Status: No Order  Advance Directive and Living Will:      Power of :    POLST:      Counseling / Coordination of Care  Total floor / unit time spent today 25 minutes    Greater than 50% of total time was spent with the patient and / or family counseling and / or coordination of care

## 2019-12-17 NOTE — ED NOTES
christo shah applied to pt   Dr Oleg Jerez, dr Enoch Holly, and dr Carisa Melvinist at the bedside upon pt arrival      Celine Schmitt RN  12/17/19 0564

## 2019-12-17 NOTE — OR NURSING
Received patient emergently in the PHA  Pt is nonverbal and arms are cool to touch  Patient transferred to OR#9  B/L pressure ulcers on heels noted immediately along with a blister on the R heel area  Nonhealing area noted on R shin        At [id number] Yamilka rogers was called

## 2019-12-17 NOTE — ANESTHESIA PREPROCEDURE EVALUATION
Review of Systems/Medical History          Cardiovascular  Hypertension ,    Pulmonary  Sleep apnea ,        GI/Hepatic            Endo/Other  Diabetes poorly controlled type 2 ,      GYN       Hematology   Musculoskeletal  Rheumatoid arthritis ,   Comment: Nec fasc B/L LE Arthritis     Neurology   Psychology   Anxiety,              Physical Exam    Airway    Mallampati score: III         Dental   upper dentures,     Cardiovascular  Rhythm: regular, Rate: normal,     Pulmonary      Other Findings        Anesthesia Plan  ASA Score- 5 Emergent    Anesthesia Type- general with ASA Monitors  Additional Monitors: arterial line and central venous line  Airway Plan: ETT  Comment: Patient brought to the OR emergently  Upon arrival to the ED her temperature was 81 degrees F which improved to 88 by the time she arrived into the OR  She was hypotensive on levophed with SBP around 80  Lactate was 9  I had a discussion with the two sons about likelihood of her needing CPR and possibility of death due to current medical condition  They understand that she is very high risk and likelihood of survival is low        Plan Factors-    Induction- rapid sequence induction and intravenous  Postoperative Plan-     Informed Consent- Anesthetic plan and risks discussed with patient  I personally reviewed this patient with the CRNA  Discussed and agreed on the Anesthesia Plan with the CRNA  Marisol Barahona

## 2019-12-18 NOTE — PROGRESS NOTES
Pt become bradycardic into the 20s with MAPs in the 40s  Called [address] who is enroute to bedside  Pushed atropine, 1g of calcium and 1 amp of sodium bicarb  BMP sent  Will continue to monitor

## 2019-12-18 NOTE — OP NOTE
OPERATIVE REPORT  PATIENT NAME: Willian Ontiveros    :  1935  MRN: [id number]  Pt Location: [facility] OR ROOM 09    SURGERY DATE: 2019    Surgeon(s) and Role:     * Abdullahi Benavides MD - Primary     * Rik Villaseñor MD - Christin Rodriguez MD - Assisting    Preop Diagnosis:  Necrotizing fasciitis ([address] ) [M72 6]    Post-Op Diagnosis Codes:     * Necrotizing fasciitis ([address] ) [M72 6]    Procedure(s) (LRB):  DEBRIDEMENT  bilateral  LOWER EXTREMITY ([address] OUT) (Bilateral)    Specimen(s):  * No specimens in log *    Estimated Blood Loss:   Minimal    Drains:  Urethral Catheter Temperature probe (Active)   Amt returned on insertion(mL) 350 mL 2019  3:43 PM   Reasons to continue Urinary Catheter  Accurate I&O assessment in critically ill patients (48 hr  max); Stage III/IV sacral ulcers or open perineal wounds in incontinent patients 2019  3:43 PM   Site Assessment Clean;Skin intact 2019  3:43 PM   Collection Container Standard drainage bag 2019  3:43 PM   Securement Method Securing device (Describe) 2019  3:43 PM   Number of days: 0       Anesthesia Type:   Choice    Operative Indications:  Necrotizing fasciitis ([address] ) [M72 6]      Operative Findings:  Necrotizing soft tissue infection of Right posterior thigh  Necrotic tissue on Left Posterior Thigh  Bilateral Dry/Chronic pressure ulcers of Heels  Bullous wound on Right Heel with serosanguinous fluid collection    Complications:     Patient was noted to lose pulses shortly after induction  ACLS was started and ROSC obtained prior to beginning of procedure  Procedure and Technique:  The patient was taken emergently from the emergency department to the OR for debridement of right posterior necrotizing soft tissue infection  The patient was identified by her wristband given her altered mental status    Upon induction of anesthesia prior to transfer to the operative table the patient was noted to have lost pulses and a code was called  ACLS was initiated with chest compressions  Two doses of epinephrine, 1 dose of bicarb and 1 dose of calcium was given by Anesthesia  On 2nd pulse check patient was noted to have return of spontaneous circulation  Patient was then transferred to the operative table and placed in the prone position  A left IJ triple-lumen catheter was placed and a right radial arterial line was placed  Once on the operative table in the prone position, the patient was prepped with Betadine and draped in the typical sterile fashion  A time-out was held and all were in agreement  There was noted to be 2 regions of necrotic tissue, 1 on the left posterior thigh and 1 on the right posterior thigh  Simultaneously both necrotic tissues were debrided sharply with a scalpel until bleeding tissue was encountered  The left posterior thigh wound had necrotic tissue removed from mid portion down to the muscular bed and was packed with 2x Radiolucent Kerlix  The right posterior thigh wound was removed sharply from mid thigh up to sacral region across buttocks  The rectum was not encountered  The true necrotic tissue was removed sharply with scissors down to and beyond the most superficial muscle bed likely the biceps femoris muscle  Upon application of Bovie cautery there was minimal reaction by the muscles however they were not grossly necrotic and appeared viable  There was good bleeding at all levels after debridement  This large wound which was the width of the entire posterior thigh and extended caudally from mid portion of thigh up to sacrum was noted to be free of grossly necrotic tissue at the end of the debridement  This portion was packed with 8x radiolucent Kerlix, 6 of which were tied together and 2 were free  At the end of the case the patient was started on an epinephrine drip with a dropping blood pressure    Gross bleeding was controlled with suture ties and the patient had large trauma ABDs applied to both wounds and the case was discontinued  The patient was then transferred to an ICU bed and remained intubated  The patient was maintained on norepinephrine, vasopressin, and epinephrine and sent to the ICU  Total Packing:  Left wound - 2x Kerlix  Right Wound - 8x Kerlix    Dr Dalia Virgen was present for the entire case      Patient Disposition:  Critical Care Unit and intubated and critically ill    SIGNATURE: Ashley Cobian MD  DATE: December 17, 2019  TIME: 7:40 PM

## 2019-12-18 NOTE — ANESTHESIA POSTPROCEDURE EVALUATION
Post-Op Assessment Note    CV Status:  Unstable       Mental Status:  Somnolent   Hydration Status:  Unstable   Airway Patency:  Patent  Airway: intubated   Post Op Vitals Reviewed: Yes      Staff: Anesthesiologist, with CRNAs           BP      Temp     Pulse     Resp     SpO2

## 2019-12-18 NOTE — DISCHARGE SUMMARY
Discharge Summary - Davide Xiong 80 y o  female MRN: [id number]    Unit/Bed#: [facility] 406-31 Encounter: [id number] PCP: Letty Edwards    Admission Date:   Admission Orders (From admission, onward)     Ordered        19  Inpatient Admission  Once                     Admitting Diagnosis: Necrotizing fasciitis ([address] ) [M72 6]  Unresponsive [R41 89]    HPI: Per Dr Brendan Martinez H&P  "Davide Xiong is a 80 y o  female who presented with altered mental status  Patient found to have necrotizing fasciitis of bilateral thighs  Patient intubated and taken urgently to operating room for debridement and washout "      Procedures Performed:   Orders Placed This Encounter   Procedures   [address] Course:      - Found by sons on the floor  AMS and necrotizing tissue of b/l LE  Brought in by EMS to [facility] ED  Workup in the ED showed an elevated lactic acid to 8 9  Hypothermia, hypotension  CT CAP w/o showed mild pulmonary edema  CT femurs b/l showed air within thigh  Edema throughout thighs  Surgery contacted  Antibiotics ordered (cefepime, clinda, vanc)  Fluid bolus given  Taken to surgery  Patient lost pulses after induction  ACLS initiated  ROSC obtained  LIJ triple lumen catheter placed  Underwent debridement of b/l LE  Upon return to the ICU  Patient continued to be hypotensive/ bradycardic on max dose pressors  Family contacted multiple times and informed of poor prognosis  Patient  the morning of           Significant Findings, Care, Treatment and Services Provided:   Surgical debridement of lower extremities    Complications: None    Disposition:      Final Diagnosis: Necrotizing Fascitis, Septic Shock    Resolved Problems  Date Reviewed: 2019    None          Condition at Time of Death: Dead    Date, Time and Cause of Death    Date of Death:  19  Time of Death:   6:00 AM  Preliminary Cause of Death:  Necrotizing fasciitis  Entered by:  Jered Tavarez DO[MD1 1]     Attribution     MD1 1 Candy Vasquez DO 12/18/19 06:25

## 2019-12-18 NOTE — PROGRESS NOTES
Vancomycin Assessment    Brunilda Metzger is a 80 y o  female who is currently receiving vancomycin 1250 mg IV once for    Necrotizing fasciitis   Relevant clinical data and objective history reviewed:  Creatinine   Date Value Ref Range Status   12/17/2019 2 21 (H) 0 60 - 1 30 mg/dL Final     Comment:     Standardized to IDMS reference method   10/28/2019 1 23 (H) 0 60 - 0 88 mg/dL Final     Comment:     For patients >52years of age, the reference limit  for Creatinine is approximately 13% higher for people  identified as -American         06/04/2019 1 49 (H) 0 60 - 0 88 mg/dL Final     Comment:     For patients >52years of age, the reference limit  for Creatinine is approximately 13% higher for people  identified as -American         10/12/2017 1 18 (H) 0 60 - 0 88 mg/dL Final     Comment:     Result Comment: For patients >52years of age, the reference limit  for Creatinine is approximately 13% higher for people  identified as -American      10/12/2017 1 14 (H) 0 60 - 0 88 mg/dL Final     Comment:     Result Comment: For patients >52years of age, the reference limit  for Creatinine is approximately 13% higher for people  identified as -American      05/04/2017 1 11 (H) 0 60 - 0 88 mg/dL Final     Comment:     Result Comment: For patients >52years of age, the reference limit  for Creatinine is approximately 13% higher for people  identified as -American  BP 96/54 (BP Location: Right arm)   Pulse 99   Temp (!) 89 2 °F (31 8 °C) (Bladder)   Resp (!) 27   Wt 120 kg (265 lb 3 4 oz)   SpO2 (!) 82%   BMI 46 98 kg/m²   I/O last 3 completed shifts:   In: 600 [Blood:350; IV Piggyback:250]  Out: 350 [Urine:350]  Lab Results   Component Value Date/Time    BUN 73 (H) 12/17/2019 02:30 PM    BUN 35 (H) 10/28/2019 09:25 AM    WBC 2 34 (L) 12/17/2019 07:25 PM    WBC 7 3 10/12/2017 11:24 AM    HGB 7 2 (L) 12/17/2019 07:25 PM    HGB 11 5 (L) 10/12/2017 11:24 AM    HCT 24 3 (L) 12/17/2019 07:25 PM    HCT 35 4 10/12/2017 11:24 AM    MCV 92 12/17/2019 07:25 PM    MCV 90 8 10/12/2017 11:24 AM     (L) 12/17/2019 07:25 PM     10/12/2017 11:24 AM     Temp Readings from Last 3 Encounters:   12/17/19 (!) 89 2 °F (31 8 °C) (Bladder)   10/18/19 98 9 °F (37 2 °C)   08/23/18 98 6 °F (37 °C)         Vancomycin Assessment: Day: 1  1  Indication: Antonio Gonzalez   Micro: blood culture pending  Procalcitonin: 4 4  2  Renal Function:   Scr: 2 21  Medications:  vasopressors  Patient-Factors: elderly,  hypotension, renal dysfunction, blood loss (surgery)  4  Days of Therapy: 1  5  Current Dose: 1250 mg IV once)  6  Goal Trough: 15-20 (appropriate for most indications)   7  Goal AUC(24h): 400-600  8  Last Level: n/a      Vancomycin Plan:  1  New Dosing: change to 750mg IV q24h (next dose:1630 on 12/18 )  Predicted Trough / AUC: 18 3/574  2  Next Level: 12/20 at 1600  3  Renal Function Monitoring:Scr, UOP    Pharmacy will continue to follow closely for s/sx of nephrotoxicity, infusion reactions and appropriateness of therapy  BMP and CBC will be ordered per protocol  We will continue to follow the patient's culture results and clinical progress daily      Antonio Gonzalez

## 2019-12-18 NOTE — PROGRESS NOTES
Pastoral Care Progress Note    2019  Patient: Bishnu Jordan : 1935  Admission Date & Time: 2019 1406  MRN: [id number] [facility]: [id number]                     Chaplaincy Interventions Utilized:   Empowerment: Clarified, confirmed, or reviewed information from treatment team     Exploration: Explored spiritual needs & resources        Relationship Building: Cultivated a relationship of care and support                Chaplaincy Outcomes Achieved:  Expressed intermediate hope    Patients family would like  to visit and provide anointing      Spiritual Coping Strategies Utilized:   Connectedness       19   Clinical Encounter Type   Visited With Patient and family together   Routine Visit Introduction   Continue Visiting Yes   Surgical Visit Post-op   Referral From Nurse   Referral To    Anabaptism Encounters   Anabaptism Needs Prayer   Sacramental Encounters   Sacrament of Sick-Anointing Family requested anointing   Family Spiritual Encounters   Family Coping Sadness   Family Support During Treatment 5   Caregiver-Patient Relationship 5

## 2019-12-18 NOTE — PROGRESS NOTES
Progress Note Tory Jurado 1935, 80 y o  female MRN: [id number]    Unit/Bed#: [place] 513-01 Encounter: [id number]    Primary Care Provider: BIRD Ross   Date and time admitted to hospital: 12/17/2019  2:06 PM        * Necrotizing fasciitis [facility])  Assessment & Plan  80-year-old female who presented with necrotizing soft tissue infection of bilateral lower extremities, now postop day 1 from debridement of bilateral posterior thighs  Patient remains in critical condition being resuscitated in the ICU  Plan:  NPO  Intubated  Isolated 150  Maintain left IJ TLC and right radial A-line      Patient is comfort care      Continue pushes of epi until family has had a change to see the patient then we will stop supportive measures      Subjective/Objective     Subjective:  Patient came in overnight with severe necrotizing fasciitis infection status post OR for operative debridement  This morning the patient was still hypothermic, it is somewhat improved from admission, and on 3 pressors while receiving intermittent epi pushes for intermittent asystole on the monitor  Objective:     Blood pressure 146/62, pulse 58, temperature (!) 96 8 °F (36 °C), resp  rate (!) 26, weight 120 kg (265 lb 3 4 oz), SpO2 94 %  ,Body mass index is 46 98 kg/m²        Intake/Output Summary (Last 24 hours) at 12/18/2019 0616  Last data filed at 12/18/2019 0200  Gross per 24 hour   Intake 7799 08 ml   Output 610 ml   Net 7189 08 ml       Invasive Devices     Central Venous Catheter Line            CVC Central Lines 12/17/19 Triple less than 1 day    CVC Central Lines 12/17/19 Triple 20cm less than 1 day          Peripheral Intravenous Line            Peripheral IV 12/17/19 Left Forearm less than 1 day    Peripheral IV 12/17/19 Left Forearm less than 1 day    Peripheral IV 12/17/19 Right Forearm less than 1 day          Arterial Line            Arterial Line 12/17/19 Left Brachial less than 1 day          Drain NG/OG/Enteral Tube Orogastric Center mouth less than 1 day    Urethral Catheter Temperature probe less than 1 day          Airway            ETT  Cuffed;Oral;Inflated; Hi-Lo 7 5 mm less than 1 day                Physical Exam:   Gen:  NAD, sedated  CV:  Asystole  Lungs:  Intubated on vent, equal chest rise  Abd: soft, nondistended,  Extremity:  Bilateral posterior thighs packed with Kerlix covered with ABD  Neuro:  Unresponsive        Lab, Imaging and other studies:  CBC:   Lab Results   Component Value Date    WBC 3 33 (L) 12/18/2019    HGB 7 9 (L) 12/18/2019    HCT 25 8 (L) 12/18/2019    MCV 93 12/18/2019     (L) 12/18/2019    MCH 28 3 12/18/2019    MCHC 30 6 (L) 12/18/2019    RDW 14 6 12/18/2019    MPV 10 5 12/18/2019    NRBC 19 12/18/2019   , CMP:   Lab Results   Component Value Date    SODIUM 149 (H) 12/18/2019    K 7 3 (HH) 12/18/2019     (H) 12/18/2019    CO2 8 (LL) 12/18/2019    CO2 24 12/17/2019    BUN 67 (H) 12/18/2019    CREATININE 2 12 (H) 12/18/2019    GLUCOSE 112 12/17/2019    CALCIUM 8 5 12/18/2019    AST 49 (H) 12/17/2019    ALT 27 12/17/2019    ALKPHOS 155 (H) 12/17/2019    EGFR 21 12/18/2019     VTE Pharmacologic Prophylaxis: Heparin  VTE Mechanical Prophylaxis: sequential compression device

## 2019-12-18 NOTE — QUICK NOTE
Pt remains in florid septic shock  She has profound metabolic, lactic acidosis  Spoke at length with sons and explained that she would not survive another operation in the prone position at this time  We will continue volume and pressor resuscitation and give PRN NaCO2 for pH 7 15     Will send troponins to r/o concomitant MI and possible cardiogenic shock

## 2019-12-18 NOTE — CONSULTS
Consult - [address] FED[address] Elizalde 80 y o  female MRN: [id number]  Unit/Bed#: [facility] 513-01 Encounter: [id number]    -------------------------------------------------------------------------------------------------------------  Chief Complaint: Necrotizing Fascitis, Septic Shock    History of Present Illness   HX and PE limited by:   Patient intubated  Rey Porter is a 80 y o  female who presented with altered mental status  Patient found to have necrotizing fasciitis of bilateral thighs  Patient intubated and taken urgently to operating room for debridement and washout  History obtained from chart review and surgical team   -------------------------------------------------------------------------------------------------------------  Assessment and Plan:    Neuro:    Diagnosis:  Altered mental status, Hypothermia  o Sedated on propofol and fentanyl GTT  o Warm fluids, Abdi Bedolla    CV:    Diagnosis: Septic Shock  o Currently on levophed, vasopressin, epinephrine gtt and resuscitative IV fluids  o Titrate epinephrine gtt as tolerated   o Maintain MAP greater than 65  Pulm:   Diagnosis: Acute respiratory failure  o AC/VC overnight   o Maintain O2 sat >94%    GI:    Diagnosis: NO acute issues  o PRN bowel regmien    :    Diagnosis: Acute kidney injury  o Trend I/O  o Monitor UOP  o Pending Cr and UOP, may require CRRT    F/E/N:    Plan:  Anion gap metabolic acidosis  Resuscitative IVF  Trend lactate/BMP/Base deficit  NPO for OR tomorrow  Heme/Onc:    Diagnosis: Acute blood loss anemia  o Postoperative Hb 7 2  o Will transfuse 1uPRBC  o Serial H/H  o Transfuse APN For goal Hb >7    Endo:    Diagnosis: T2DM  o SSI  POC APN for goal < 180      ID:    Diagnosis: Necrotizing Fascitis, Septic Shock  o Triple Abx therapy  o Trend temp  o Daily CBC  o Blood culture pending    MSK/Skin:    Diagnosis: s/p debridement left thigh necrotizing fascitis  o Left thigh debridement site open  o Will require return to OR for further washout per surgery team  o Frequent repositioning    Disposition: Continue Critical Care   Code Status: Level 1 - Full Code  --------------------------------------------------------------------------------------------------------------  Review of Systems   Unable to perform ROS: Intubated       Review of systems was unable to be performed secondary to Patient intubated  Physical Exam   Constitutional: She is intubated  HENT:   Head: Normocephalic and atraumatic  Eyes: Pupils are equal, round, and reactive to light  Conjunctivae are normal    Neck: Neck supple  Cardiovascular: Normal rate and regular rhythm  Pulmonary/Chest: Effort normal and breath sounds normal  She is intubated  She has no wheezes  She has no rales  Abdominal: Soft  She exhibits no distension  There is no tenderness  There is no rebound and no guarding  Musculoskeletal:   Extremity wounds open  Dried blood present  Healed debridement of right lower extremity  Skin: Capillary refill takes less than 2 seconds  Rash (Posterior thigh) noted  Nursing note and vitals reviewed  --------------------------------------------------------------------------------------------------------------  Historical Information   Past Medical History:   Diagnosis Date    Benign essential hypertension     [facility] mellitus ([facility] )     Pulmonary embolism ([place] [address] )     last assessed: 4/16/2013    Type 2 diabetes mellitus ([place] [address] )     description: stable off meds Last assessed: 3/19/2015     History reviewed  No pertinent surgical history    Social History   Social History     Substance and Sexual Activity   Alcohol Use No     Social History     Substance and Sexual Activity   Drug Use Not on file     Social History     Tobacco Use   Smoking Status Never Smoker   Smokeless Tobacco Never Used     Family History:   Family History   Problem Relation Age of Onset    Stroke Mother     Colon cancer Father      Vitals:   Vitals: 12/17/19 2250 12/17/19 2300 12/17/19 2308 12/17/19 2310   BP: (!) 132/45 (!) 135/45  (!) 141/47   BP Location:       Pulse: 90 90 90 92   Resp: (!) 30 (!) 32 (!) 30 (!) 30   Temp: (!) 91 8 °F (33 2 °C) (!) 91 8 °F (33 2 °C) (!) 92 1 °F (33 4 °C) (!) 92 1 °F (33 4 °C)   TempSrc:  Bladder     SpO2: 100% 100%     Weight:         Temp  Min: 87 1 °F (30 6 °C)  Max: 92 1 °F (33 4 °C)  IBW: -92 5 kg     Body mass index is 46 98 kg/m²  Medications:  Current Facility-Administered Medications   Medication Dose Route Frequency    [START ON 12/18/2019] cefepime (MAXIPIME) 1,000 mg in dextrose 5 % 50 mL IVPB  1,000 mg Intravenous Q12H    [START ON 12/18/2019] clindamycin (CLEOCIN) IVPB (premix) 600 mg  600 mg Intravenous Q8H    EPINEPHrine 3000 mcg (STANDARD CONCENTRATION) IV in sodium chloride 0 9% 250 mL  1-10 mcg/min Intravenous Titrated    fentaNYL 1000 mcg in sodium chloride 0 9% 100mL infusion  75 mcg/hr Intravenous Continuous    heparin (porcine) subcutaneous injection 5,000 Units  5,000 Units Subcutaneous Q8H [address]    metroNIDAZOLE (FLAGYL) IVPB (premix) 500 mg  500 mg Intravenous Q8H    multi-electrolyte (ISOLYTE-S PH 7 4) bolus 1,000 mL  1,000 mL Intravenous Once    multi-electrolyte (PLASMALYTE-A/ISOLYTE-S PH 7 4) IV solution  150 mL/hr Intravenous Continuous    norepinephrine (LEVOPHED) 4 mg (STANDARD CONCENTRATION) IV in sodium chloride 0 9% 250 mL  1-30 mcg/min Intravenous Titrated    propofol (DIPRIVAN) 1000 mg in 100 mL infusion (premix)  5-50 mcg/kg/min Intravenous Titrated    sodium chloride (PF) 0 9 % injection 3 mL  3 mL Intravenous PRN    [START ON 12/18/2019] vancomycin (VANCOCIN) IVPB (premix) 750 mg  10 mg/kg (Adjusted) Intravenous Q24H    vasopressin (PITRESSIN) 20 Units in sodium chloride 0 9 % 100 mL infusion  0 04 Units/min Intravenous Continuous     Home medications:  Prior to Admission Medications   Prescriptions Last Dose Informant Patient Reported? Taking?    ALPRAZolam (XANAX) 0 25 mg tablet   No No   Sig: Take 1 tablet (0 25 mg total) by mouth 2 (two) times a day as needed for anxiety   aspirin 81 MG tablet   Yes No   Sig: Take 1 tablet by mouth daily   clobetasol (TEMOVATE) 0 05 % external solution   No No   Sig: Apply topically 2 (two) times a day   cyanocobalamin (VITAMIN B-12) 1,000 mcg tablet   Yes No   Sig: Take by mouth   fosinopril (MONOPRIL) 40 mg tablet   No No   Sig: Take 1 tablet (40 mg total) by mouth daily   furosemide (LASIX) 40 mg tablet   No No   Sig: Take 1 tablet (40 mg total) by mouth daily   latanoprost (XALATAN) 0 005 % ophthalmic solution   Yes No   Sig: Apply to eye   leucovorin (WELLCOVORIN) 5 mg tablet   Yes No   Sig: Take by mouth   methotrexate 2 5 mg tablet   Yes No   Sig: Take 6 tablets by mouth once a week   metoprolol tartrate (LOPRESSOR) 25 mg tablet   No No   Sig: Take 0 5 tablets (12 5 mg total) by mouth 2 (two) times a day   pilocarpine (ISOPTO CARPINE) 1 % ophthalmic solution   Yes No   Sig: INSTILL 1 DROP IN THE LEFT EYE TWO TIMES A DAY   pravastatin (PRAVACHOL) 20 mg tablet   No No   Sig: Take 1 tablet (20 mg total) by mouth daily   sulfaSALAzine (AZULFIDINE-EN) 500 mg EC tablet   Yes No   Sig: Take 2 tablets by mouth 2 (two) times a day      Facility-Administered Medications: None     Allergies:  No Known Allergies      Laboratory and Diagnostics:  Results from last 7 days   Lab Units 12/17/19 1925 12/17/19  1430   WBC Thousand/uL 2 34* 3 09*   HEMOGLOBIN g/dL 7 2* 8 7*   HEMATOCRIT % 24 3* 29 8*   PLATELETS Thousands/uL 136* 183   BANDS PCT % 2 2   MONO PCT % 9 16*     Results from last 7 days   Lab Units 12/17/19 1929 12/17/19  1430   SODIUM mmol/L 147* 142   POTASSIUM mmol/L 4 8 4 2   CHLORIDE mmol/L 115* 116*   CO2 mmol/L 14* 10*   ANION GAP mmol/L 18* 16*   BUN mg/dL 73* 73*   CREATININE mg/dL 2 34* 2 21*   CALCIUM mg/dL 9 0 7 7*   GLUCOSE RANDOM mg/dL 165* 98   ALT U/L  --  27   AST U/L  --  49*   ALK PHOS U/L  --  155*   ALBUMIN g/dL  --  1 1* TOTAL BILIRUBIN mg/dL  --  0 58          Results from last 7 days   Lab Units 12/17/19 1925 12/17/19  1430   INR  2 36* 1 59*   PTT seconds 49* 36      Results from last 7 days   Lab Units 12/17/19  1430   TROPONIN I ng/mL 0 04     Results from last 7 days   Lab Units 12/17/19 1925 12/17/19  1430   LACTIC ACID mmol/L 13 4* 8 9*     ABG:  Results from last 7 days   Lab Units 12/17/19 2011   PH ART  7 087*   PCO2 ART mm Hg 31 2*   PO2 ART mm Hg 201 1*   HCO3 ART mmol/L 9 2*   BASE EXC ART mmol/L -19 2   ABG SOURCE  Line, Arterial     VBG:  Results from last 7 days   Lab Units 12/17/19 2011   ABG SOURCE  Line, Arterial     Results from last 7 days   Lab Units 12/17/19  1430   PROCALCITONIN ng/ml 4 40*       Micro:  Results from last 7 days   Lab Units 12/17/19  1430   BLOOD CULTURE  Received in Microbiology Lab  Culture in Progress  Received in Microbiology Lab  Culture in Progress  Imaging: I have personally reviewed pertinent reports  ------------------------------------------------------------------------------------------------------------  Advance Directive and Living Will:      Power of :    POLST:    ------------------------------------------------------------------------------------------------------------  Anticipated Length of Stay is > 2 midnights    Counseling / Coordination of Care  Total time spent today 60 minutes  Greater than 50% of total time was spent with the patient and / or family counseling and / or coordination of care  A description of the counseling / coordination of care: History and physical, chart review, coordination of care  Marlon Johnson DO    Portions of the record may have been created with voice recognition software  Occasional wrong word or "sound a like" substitutions may have occurred due to the inherent limitations of voice recognition software    Read the chart carefully and recognize, using context, where substitutions have occurred

## 2019-12-18 NOTE — DEATH NOTE
INPATIENT DEATH NOTE  Nicholas Elizalde 80 y o  female MRN: [id number]  [facility] Encounter: [id number]    Date, Time and Cause of Death    Date of Death:  19  Time of Death:   6:00 AM  Preliminary Cause of Death:  Necrotizing fasciitis  Entered by:  Aundrea Crain DO[MD1 1]     Attribution     MD1 1 Valorie Rivero DO 19 06:25           Patient's Information  Pronounced by: Aundrea Crain DO  Did the patient's death occur in the ED?: No  Did the patient's death occur in the OR?: No  Did the patient's death occur less than 10 days post-op?: Yes  Did the patient's death occur within 24 hours of admission?: Yes  Was code status DNR at the time of death?: No    PHYSICAL EXAM:  Unresponsive to noxious stimuli, Spontaneous respirations absent, Breath sounds absent, Carotid pulse absent, Heart sounds absent, Pupillary light reflex absent and Corneal blink reflex absent    Medical Examiner notification criteria:  Patient  within 24 hours of arrival to hospital and  within 24 hours of surgery / procedure / anesthesia   Medical Examiner's office notified?:  Yes   Medical Examiner accepted case?:  No  Name of Medical Examiner: Sandrita Handley    Family Notification  Was the family notified?: Yes  Date Notified: 19  Time Notified: 0600  Notified by: Juliet Rojas DO  Name of Family Notified of Death: Lauryn Orourke   Relationship to Patient: Son  Family Notification Route:  At bedside  Was the family told to contact a  home?: Yes  Name of  Home[de-identified] [place]    Autopsy Options:  Post-mortem examination declined by next of kin    Primary Service Attending Physician notified?:  yes - Attending:  Benigno Abreu MD    Physician/Resident responsible for completing Discharge Summary:  Aundrea Crain DO

## 2019-12-18 NOTE — ASSESSMENT & PLAN NOTE
72-year-old female who presented with necrotizing soft tissue infection of bilateral lower extremities, now postop day 1 from debridement of bilateral posterior thighs  Patient remains in critical condition being resuscitated in the ICU        Plan:  NPO  Intubated  Isolated 150  Maintain left IJ TLC and right radial A-line  Patient is comfort care  Continue pushes of epi until family has had a change to see the patient then we will stop supportive measures

## 2019-12-18 NOTE — RESPIRATORY THERAPY NOTE
RT Ventilator Management Note  Deeajy William 80 y o  female MRN: [id number]  Unit/Bed#: [facility] 513-01 Encounter: [id number]      Daily Screen     No data found in the last 10 encounters  Physical Exam:   Assessment Type: Assess only  General Appearance: Sedated  Respiratory Pattern: Assisted  Chest Assessment: Chest expansion symmetrical  Bilateral Breath Sounds: Diminished  O2 Device: vent      Resp Comments: Pt has no significant pulmonary hx nor usage of pulmonary medications  No apparent distress at this time, will continue to monitor as per protocol  Pt currently on  vent with settings A/C mode; 500/16/100%, PEEP +5cmh20  ETT secured @ 23cm at the lips, site dry  BBS decreased, scant secretions when sx'd  Vent alarms are on, set and working properly  Will continue to monitor and titrate care as per protocol and physician order

## 2019-12-18 NOTE — RESPIRATORY THERAPY NOTE
RT Protocol Note  Ayala Elizalde 80 y o  female MRN: [id number]  Unit/Bed#: [facility] 844-03 Encounter: [id number]    Assessment    Principal Problem:    Necrotizing fasciitis ([address] )      Home Pulmonary Medications:  None       Past Medical History:   Diagnosis Date    Benign essential hypertension     Diabetes mellitus ([address] )     [facility]     last assessed: 4/16/2013    Type 2 diabetes mellitus ([facility] )     description: stable off meds Last assessed: 3/19/2015     Social History     Socioeconomic History    Marital status:      Spouse name: None    Number of children: None    Years of education: None    Highest education level: None   Occupational History    None   Social Needs    Financial resource strain: None    Food insecurity:     Worry: None     Inability: None    Transportation needs:     Medical: None     Non-medical: None   Tobacco Use    Smoking status: Never Smoker    Smokeless tobacco: Never Used   Substance and Sexual Activity    Alcohol use: No    Drug use: None    Sexual activity: None   Lifestyle    Physical activity:     Days per week: None     Minutes per session: None    Stress: None   Relationships    Social connections:     Talks on phone: None     Gets together: None     Attends Denominational service: None     Active member of club or organization: None     Attends meetings of clubs or organizations: None     Relationship status: None    Intimate partner violence:     Fear of current or ex partner: None     Emotionally abused: None     Physically abused: None     Forced sexual activity: None   Other Topics Concern    None   Social History Narrative    None       Subjective         Objective    Physical Exam:   Assessment Type: Assess only  General Appearance: Sedated  Respiratory Pattern: Assisted  Chest Assessment: Chest expansion symmetrical  Bilateral Breath Sounds: Diminished  O2 Device: vent    Vitals:  Blood pressure 96/54, pulse 90, temperature (!) 89 2 °F (31 8 °C), resp  rate (!) 27, weight 120 kg (265 lb 3 4 oz), SpO2 (!) 82 %  Results from last 7 days   Lab Units 12/17/19 2011   Lonnie 30 ART  7 087*   PCO2 ART mm Hg 31 2*   PO2 ART mm Hg 201 1*   HCO3 ART mmol/L 9 2*   BASE EXC ART mmol/L -19 2   O2 CONTENT ART mL/dL 12 0*   O2 HGB, ARTERIAL % 97 6*   ABG SOURCE  Line, Arterial       Imaging and other studies: I have personally reviewed pertinent reports  O2 Device: vent     Plan    Respiratory Plan: Vent/NIV/HFNC        Resp Comments: Pt has no significant pulmonary hx nor usage of pulmonary medications  No apparent distress at this time, will continue to monitor as per protocol

## 2019-12-19 LAB
ABO GROUP BLD BPU: NORMAL
ABO GROUP BLD BPU: NORMAL
ATRIAL RATE: 208 BPM
ATRIAL RATE: 59 BPM
ATRIAL RATE: 80 BPM
BPU ID: NORMAL
BPU ID: NORMAL
CROSSMATCH: NORMAL
CROSSMATCH: NORMAL
P AXIS: 43 DEGREES
PR INTERVAL: 1049 MS
PR INTERVAL: 164 MS
QRS AXIS: -75 DEGREES
QRS AXIS: 10 DEGREES
QRS AXIS: 9 DEGREES
QRSD INTERVAL: 113 MS
QRSD INTERVAL: 82 MS
QRSD INTERVAL: 94 MS
QT INTERVAL: 420 MS
QT INTERVAL: 422 MS
QT INTERVAL: 425 MS
QTC INTERVAL: 421 MS
QTC INTERVAL: 478 MS
QTC INTERVAL: 483 MS
T WAVE AXIS: 47 DEGREES
T WAVE AXIS: 7 DEGREES
T WAVE AXIS: 88 DEGREES
UNIT DISPENSE STATUS: NORMAL
UNIT DISPENSE STATUS: NORMAL
UNIT PRODUCT CODE: NORMAL
UNIT PRODUCT CODE: NORMAL
UNIT RH: NORMAL
UNIT RH: NORMAL
VENTRICULAR RATE: 59 BPM
VENTRICULAR RATE: 78 BPM
VENTRICULAR RATE: 79 BPM

## 2019-12-19 PROCEDURE — 93010 ELECTROCARDIOGRAM REPORT: CPT | Performed by: INTERNAL MEDICINE

## 2019-12-19 NOTE — CLINICAL RISK MANAGEMENT
Progress Note - Death in Restraints   Joey Elizalde 80 y o  female MRN: [id number]  Unit/Bed#: [facility] 513-01 Encounter: [id number]      Patient  while restrained  with Soft restraint right wrist and Soft restraint left wrist  Death unrelated to use of restraints  This situation was tracked internally  CMS and [email or web address]  notification not required

## 2019-12-20 LAB
BACTERIA BLD CULT: ABNORMAL
GRAM STN SPEC: ABNORMAL
GRAM STN SPEC: ABNORMAL

## (undated) DEVICE — PLUMEPEN PRO 10FT

## (undated) DEVICE — GLOVE INDICATOR PI UNDERGLOVE SZ 8 BLUE

## (undated) DEVICE — SPONGE STICK WITH PVP-I: Brand: KENDALL

## (undated) DEVICE — INTENDED FOR TISSUE SEPARATION, AND OTHER PROCEDURES THAT REQUIRE A SHARP SURGICAL BLADE TO PUNCTURE OR CUT.: Brand: BARD-PARKER SAFETY BLADES SIZE 15, STERILE

## (undated) DEVICE — SPONGE PACKING 4.5 X 22 IN STRL X-RAY DETECT

## (undated) DEVICE — ABDOMINAL PAD: Brand: DERMACEA

## (undated) DEVICE — 3000CC GUARDIAN II: Brand: GUARDIAN

## (undated) DEVICE — KERLIX BANDAGE ROLL: Brand: KERLIX

## (undated) DEVICE — PACK MAJOR ORTHO W/SPLITS PBDS

## (undated) DEVICE — GLOVE SRG BIOGEL 8